# Patient Record
Sex: MALE | Race: WHITE | NOT HISPANIC OR LATINO | Employment: UNEMPLOYED | URBAN - METROPOLITAN AREA
[De-identification: names, ages, dates, MRNs, and addresses within clinical notes are randomized per-mention and may not be internally consistent; named-entity substitution may affect disease eponyms.]

---

## 2018-11-19 ENCOUNTER — HOSPITAL ENCOUNTER (INPATIENT)
Facility: MEDICAL CENTER | Age: 38
LOS: 10 days | DRG: 897 | End: 2018-11-29
Attending: EMERGENCY MEDICINE | Admitting: INTERNAL MEDICINE
Payer: COMMERCIAL

## 2018-11-19 DIAGNOSIS — F10.10 ALCOHOL ABUSE: ICD-10-CM

## 2018-11-19 LAB
ALBUMIN SERPL BCP-MCNC: 4.4 G/DL (ref 3.2–4.9)
ALBUMIN/GLOB SERPL: 1.5 G/DL
ALP SERPL-CCNC: 89 U/L (ref 30–99)
ALT SERPL-CCNC: 22 U/L (ref 2–50)
AMPHET UR QL SCN: NEGATIVE
ANION GAP SERPL CALC-SCNC: 15 MMOL/L (ref 0–11.9)
AST SERPL-CCNC: 23 U/L (ref 12–45)
BARBITURATES UR QL SCN: NEGATIVE
BASOPHILS # BLD AUTO: 1 % (ref 0–1.8)
BASOPHILS # BLD: 0.1 K/UL (ref 0–0.12)
BENZODIAZ UR QL SCN: NEGATIVE
BILIRUB SERPL-MCNC: 0.3 MG/DL (ref 0.1–1.5)
BUN SERPL-MCNC: 11 MG/DL (ref 8–22)
BZE UR QL SCN: NEGATIVE
CALCIUM SERPL-MCNC: 9.3 MG/DL (ref 8.5–10.5)
CANNABINOIDS UR QL SCN: NEGATIVE
CHLORIDE SERPL-SCNC: 102 MMOL/L (ref 96–112)
CO2 SERPL-SCNC: 25 MMOL/L (ref 20–33)
CREAT SERPL-MCNC: 1.17 MG/DL (ref 0.5–1.4)
EOSINOPHIL # BLD AUTO: 0.02 K/UL (ref 0–0.51)
EOSINOPHIL NFR BLD: 0.2 % (ref 0–6.9)
ERYTHROCYTE [DISTWIDTH] IN BLOOD BY AUTOMATED COUNT: 40 FL (ref 35.9–50)
ETHANOL BLD-MCNC: 0.29 G/DL
GLOBULIN SER CALC-MCNC: 2.9 G/DL (ref 1.9–3.5)
GLUCOSE SERPL-MCNC: 129 MG/DL (ref 65–99)
HCT VFR BLD AUTO: 49.7 % (ref 42–52)
HGB BLD-MCNC: 16.7 G/DL (ref 14–18)
IMM GRANULOCYTES # BLD AUTO: 0.03 K/UL (ref 0–0.11)
IMM GRANULOCYTES NFR BLD AUTO: 0.3 % (ref 0–0.9)
LIPASE SERPL-CCNC: 45 U/L (ref 11–82)
LYMPHOCYTES # BLD AUTO: 2.32 K/UL (ref 1–4.8)
LYMPHOCYTES NFR BLD: 22.2 % (ref 22–41)
MCH RBC QN AUTO: 31.5 PG (ref 27–33)
MCHC RBC AUTO-ENTMCNC: 33.6 G/DL (ref 33.7–35.3)
MCV RBC AUTO: 93.6 FL (ref 81.4–97.8)
METHADONE UR QL SCN: NEGATIVE
MONOCYTES # BLD AUTO: 0.71 K/UL (ref 0–0.85)
MONOCYTES NFR BLD AUTO: 6.8 % (ref 0–13.4)
NEUTROPHILS # BLD AUTO: 7.25 K/UL (ref 1.82–7.42)
NEUTROPHILS NFR BLD: 69.5 % (ref 44–72)
NRBC # BLD AUTO: 0 K/UL
NRBC BLD-RTO: 0 /100 WBC
OPIATES UR QL SCN: NEGATIVE
OXYCODONE UR QL SCN: NEGATIVE
PCP UR QL SCN: NEGATIVE
PLATELET # BLD AUTO: 271 K/UL (ref 164–446)
PMV BLD AUTO: 9 FL (ref 9–12.9)
POTASSIUM SERPL-SCNC: 3.8 MMOL/L (ref 3.6–5.5)
PROPOXYPH UR QL SCN: NEGATIVE
PROT SERPL-MCNC: 7.3 G/DL (ref 6–8.2)
RBC # BLD AUTO: 5.31 M/UL (ref 4.7–6.1)
SODIUM SERPL-SCNC: 142 MMOL/L (ref 135–145)
WBC # BLD AUTO: 10.4 K/UL (ref 4.8–10.8)

## 2018-11-19 PROCEDURE — 99285 EMERGENCY DEPT VISIT HI MDM: CPT

## 2018-11-19 PROCEDURE — 85025 COMPLETE CBC W/AUTO DIFF WBC: CPT

## 2018-11-19 PROCEDURE — 700105 HCHG RX REV CODE 258: Performed by: EMERGENCY MEDICINE

## 2018-11-19 PROCEDURE — 96374 THER/PROPH/DIAG INJ IV PUSH: CPT

## 2018-11-19 PROCEDURE — 94760 N-INVAS EAR/PLS OXIMETRY 1: CPT

## 2018-11-19 PROCEDURE — 85610 PROTHROMBIN TIME: CPT

## 2018-11-19 PROCEDURE — 83735 ASSAY OF MAGNESIUM: CPT

## 2018-11-19 PROCEDURE — 80307 DRUG TEST PRSMV CHEM ANLYZR: CPT

## 2018-11-19 PROCEDURE — 770020 HCHG ROOM/CARE - TELE (206)

## 2018-11-19 PROCEDURE — 700111 HCHG RX REV CODE 636 W/ 250 OVERRIDE (IP): Performed by: EMERGENCY MEDICINE

## 2018-11-19 PROCEDURE — 80053 COMPREHEN METABOLIC PANEL: CPT

## 2018-11-19 PROCEDURE — 83690 ASSAY OF LIPASE: CPT

## 2018-11-19 PROCEDURE — HZ2ZZZZ DETOXIFICATION SERVICES FOR SUBSTANCE ABUSE TREATMENT: ICD-10-PCS | Performed by: INTERNAL MEDICINE

## 2018-11-19 PROCEDURE — 36415 COLL VENOUS BLD VENIPUNCTURE: CPT

## 2018-11-19 RX ORDER — NICOTINE 21 MG/24HR
21 PATCH, TRANSDERMAL 24 HOURS TRANSDERMAL
Status: DISCONTINUED | OUTPATIENT
Start: 2018-11-20 | End: 2018-11-29 | Stop reason: HOSPADM

## 2018-11-19 RX ORDER — LORAZEPAM 2 MG/ML
1.5 INJECTION INTRAMUSCULAR
Status: DISCONTINUED | OUTPATIENT
Start: 2018-11-19 | End: 2018-11-22

## 2018-11-19 RX ORDER — LORAZEPAM 2 MG/ML
1 INJECTION INTRAMUSCULAR ONCE
Status: COMPLETED | OUTPATIENT
Start: 2018-11-19 | End: 2018-11-19

## 2018-11-19 RX ORDER — LORAZEPAM 1 MG/1
1 TABLET ORAL EVERY 4 HOURS PRN
Status: DISCONTINUED | OUTPATIENT
Start: 2018-11-19 | End: 2018-11-22

## 2018-11-19 RX ORDER — POLYETHYLENE GLYCOL 3350 17 G/17G
1 POWDER, FOR SOLUTION ORAL
Status: DISCONTINUED | OUTPATIENT
Start: 2018-11-19 | End: 2018-11-29 | Stop reason: HOSPADM

## 2018-11-19 RX ORDER — LORAZEPAM 2 MG/ML
1 INJECTION INTRAMUSCULAR
Status: DISCONTINUED | OUTPATIENT
Start: 2018-11-19 | End: 2018-11-22

## 2018-11-19 RX ORDER — POTASSIUM CHLORIDE 20 MEQ/1
20 TABLET, EXTENDED RELEASE ORAL ONCE
Status: COMPLETED | OUTPATIENT
Start: 2018-11-20 | End: 2018-11-20

## 2018-11-19 RX ORDER — LORAZEPAM 1 MG/1
2 TABLET ORAL
Status: DISCONTINUED | OUTPATIENT
Start: 2018-11-19 | End: 2018-11-22

## 2018-11-19 RX ORDER — FLUOXETINE 10 MG/1
10 CAPSULE ORAL DAILY
COMMUNITY
End: 2018-11-19

## 2018-11-19 RX ORDER — LORAZEPAM 2 MG/ML
0.5 INJECTION INTRAMUSCULAR EVERY 4 HOURS PRN
Status: DISCONTINUED | OUTPATIENT
Start: 2018-11-19 | End: 2018-11-22

## 2018-11-19 RX ORDER — LORAZEPAM 0.5 MG/1
0.5 TABLET ORAL EVERY 4 HOURS PRN
Status: DISCONTINUED | OUTPATIENT
Start: 2018-11-19 | End: 2018-11-22

## 2018-11-19 RX ORDER — FOLIC ACID 1 MG/1
1 TABLET ORAL DAILY
Status: COMPLETED | OUTPATIENT
Start: 2018-11-21 | End: 2018-11-24

## 2018-11-19 RX ORDER — BISACODYL 10 MG
10 SUPPOSITORY, RECTAL RECTAL
Status: DISCONTINUED | OUTPATIENT
Start: 2018-11-19 | End: 2018-11-29 | Stop reason: HOSPADM

## 2018-11-19 RX ORDER — GABAPENTIN 300 MG/1
300 CAPSULE ORAL 3 TIMES DAILY
Status: ON HOLD | COMMUNITY
End: 2018-11-28

## 2018-11-19 RX ORDER — SODIUM CHLORIDE 9 MG/ML
2000 INJECTION, SOLUTION INTRAVENOUS ONCE
Status: COMPLETED | OUTPATIENT
Start: 2018-11-19 | End: 2018-11-20

## 2018-11-19 RX ORDER — THIAMINE MONONITRATE (VIT B1) 100 MG
100 TABLET ORAL DAILY
Status: COMPLETED | OUTPATIENT
Start: 2018-11-21 | End: 2018-11-24

## 2018-11-19 RX ORDER — LORAZEPAM 2 MG/ML
2 INJECTION INTRAMUSCULAR
Status: DISCONTINUED | OUTPATIENT
Start: 2018-11-19 | End: 2018-11-22

## 2018-11-19 RX ORDER — LABETALOL HYDROCHLORIDE 5 MG/ML
10 INJECTION, SOLUTION INTRAVENOUS EVERY 4 HOURS PRN
Status: DISCONTINUED | OUTPATIENT
Start: 2018-11-19 | End: 2018-11-20

## 2018-11-19 RX ORDER — LORAZEPAM 1 MG/1
4 TABLET ORAL
Status: DISCONTINUED | OUTPATIENT
Start: 2018-11-19 | End: 2018-11-22

## 2018-11-19 RX ORDER — LORAZEPAM 1 MG/1
3 TABLET ORAL
Status: DISCONTINUED | OUTPATIENT
Start: 2018-11-19 | End: 2018-11-22

## 2018-11-19 RX ORDER — AMOXICILLIN 250 MG
2 CAPSULE ORAL 2 TIMES DAILY
Status: DISCONTINUED | OUTPATIENT
Start: 2018-11-20 | End: 2018-11-29 | Stop reason: HOSPADM

## 2018-11-19 RX ORDER — TRAZODONE HYDROCHLORIDE 100 MG/1
100 TABLET ORAL NIGHTLY
Status: ON HOLD | COMMUNITY
End: 2018-11-28

## 2018-11-19 RX ORDER — ACETAMINOPHEN 325 MG/1
650 TABLET ORAL EVERY 6 HOURS PRN
Status: DISCONTINUED | OUTPATIENT
Start: 2018-11-19 | End: 2018-11-29 | Stop reason: HOSPADM

## 2018-11-19 RX ADMIN — SODIUM CHLORIDE 2000 ML: 9 INJECTION, SOLUTION INTRAVENOUS at 21:59

## 2018-11-19 RX ADMIN — LORAZEPAM 1 MG: 2 INJECTION INTRAMUSCULAR; INTRAVENOUS at 21:59

## 2018-11-19 ASSESSMENT — LIFESTYLE VARIABLES
AVERAGE NUMBER OF DAYS PER WEEK YOU HAVE A DRINK CONTAINING ALCOHOL: 7
DOES PATIENT WANT TO TALK TO SOMEONE ABOUT QUITTING: YES
HOW MANY TIMES IN THE PAST YEAR HAVE YOU HAD 5 OR MORE DRINKS IN A DAY: 3
TOTAL SCORE: 4
EVER HAD A DRINK FIRST THING IN THE MORNING TO STEADY YOUR NERVES TO GET RID OF A HANGOVER: YES
HAVE PEOPLE ANNOYED YOU BY CRITICIZING YOUR DRINKING: YES
DO YOU DRINK ALCOHOL: YES
DOES PATIENT WANT TO STOP DRINKING: YES
HAVE YOU EVER FELT YOU SHOULD CUT DOWN ON YOUR DRINKING: YES
ON A TYPICAL DAY WHEN YOU DRINK ALCOHOL HOW MANY DRINKS DO YOU HAVE: 12
CONSUMPTION TOTAL: POSITIVE
TOTAL SCORE: 4
TOTAL SCORE: 4
EVER FELT BAD OR GUILTY ABOUT YOUR DRINKING: YES

## 2018-11-19 ASSESSMENT — PAIN SCALES - GENERAL: PAINLEVEL_OUTOF10: 4

## 2018-11-20 PROBLEM — J45.909 ASTHMA: Status: ACTIVE | Noted: 2018-11-20

## 2018-11-20 PROBLEM — F10.10 ALCOHOL ABUSE: Status: ACTIVE | Noted: 2018-11-20

## 2018-11-20 PROBLEM — M19.90 ARTHRITIS: Status: ACTIVE | Noted: 2018-11-20

## 2018-11-20 PROBLEM — F41.9 ANXIETY: Status: ACTIVE | Noted: 2018-11-20

## 2018-11-20 PROBLEM — G47.00 INSOMNIA: Status: ACTIVE | Noted: 2018-11-20

## 2018-11-20 LAB
EKG IMPRESSION: NORMAL
INR PPP: 0.96 (ref 0.87–1.13)
MAGNESIUM SERPL-MCNC: 2 MG/DL (ref 1.5–2.5)
PROTHROMBIN TIME: 12.8 SEC (ref 12–14.6)

## 2018-11-20 PROCEDURE — 770001 HCHG ROOM/CARE - MED/SURG/GYN PRIV*

## 2018-11-20 PROCEDURE — 93010 ELECTROCARDIOGRAM REPORT: CPT | Performed by: INTERNAL MEDICINE

## 2018-11-20 PROCEDURE — 90732 PPSV23 VACC 2 YRS+ SUBQ/IM: CPT | Performed by: INTERNAL MEDICINE

## 2018-11-20 PROCEDURE — 700111 HCHG RX REV CODE 636 W/ 250 OVERRIDE (IP): Performed by: STUDENT IN AN ORGANIZED HEALTH CARE EDUCATION/TRAINING PROGRAM

## 2018-11-20 PROCEDURE — 700105 HCHG RX REV CODE 258: Performed by: STUDENT IN AN ORGANIZED HEALTH CARE EDUCATION/TRAINING PROGRAM

## 2018-11-20 PROCEDURE — A9270 NON-COVERED ITEM OR SERVICE: HCPCS | Performed by: STUDENT IN AN ORGANIZED HEALTH CARE EDUCATION/TRAINING PROGRAM

## 2018-11-20 PROCEDURE — 93005 ELECTROCARDIOGRAM TRACING: CPT | Performed by: STUDENT IN AN ORGANIZED HEALTH CARE EDUCATION/TRAINING PROGRAM

## 2018-11-20 PROCEDURE — 700102 HCHG RX REV CODE 250 W/ 637 OVERRIDE(OP): Performed by: STUDENT IN AN ORGANIZED HEALTH CARE EDUCATION/TRAINING PROGRAM

## 2018-11-20 PROCEDURE — 99223 1ST HOSP IP/OBS HIGH 75: CPT | Mod: AI,GC | Performed by: INTERNAL MEDICINE

## 2018-11-20 PROCEDURE — 700111 HCHG RX REV CODE 636 W/ 250 OVERRIDE (IP): Performed by: INTERNAL MEDICINE

## 2018-11-20 PROCEDURE — 90471 IMMUNIZATION ADMIN: CPT

## 2018-11-20 PROCEDURE — 700101 HCHG RX REV CODE 250: Performed by: STUDENT IN AN ORGANIZED HEALTH CARE EDUCATION/TRAINING PROGRAM

## 2018-11-20 RX ORDER — FLUOXETINE HYDROCHLORIDE 20 MG/1
20 CAPSULE ORAL DAILY
Status: ON HOLD | COMMUNITY
End: 2018-11-28

## 2018-11-20 RX ORDER — SODIUM CHLORIDE 9 MG/ML
INJECTION, SOLUTION INTRAVENOUS CONTINUOUS
Status: DISCONTINUED | OUTPATIENT
Start: 2018-11-20 | End: 2018-11-22

## 2018-11-20 RX ORDER — FLUOXETINE 10 MG/1
10 CAPSULE ORAL DAILY
Status: DISCONTINUED | OUTPATIENT
Start: 2018-11-20 | End: 2018-11-20

## 2018-11-20 RX ORDER — GABAPENTIN 300 MG/1
300 CAPSULE ORAL 3 TIMES DAILY
Status: DISCONTINUED | OUTPATIENT
Start: 2018-11-20 | End: 2018-11-29 | Stop reason: HOSPADM

## 2018-11-20 RX ORDER — FLUOXETINE HYDROCHLORIDE 20 MG/1
20 CAPSULE ORAL DAILY
Status: DISCONTINUED | OUTPATIENT
Start: 2018-11-21 | End: 2018-11-22

## 2018-11-20 RX ORDER — TRAZODONE HYDROCHLORIDE 50 MG/1
100 TABLET ORAL NIGHTLY
Status: DISCONTINUED | OUTPATIENT
Start: 2018-11-20 | End: 2018-11-22

## 2018-11-20 RX ORDER — HYDRALAZINE HYDROCHLORIDE 20 MG/ML
10 INJECTION INTRAMUSCULAR; INTRAVENOUS EVERY 6 HOURS PRN
Status: DISCONTINUED | OUTPATIENT
Start: 2018-11-20 | End: 2018-11-29 | Stop reason: HOSPADM

## 2018-11-20 RX ORDER — ALBUTEROL SULFATE 90 UG/1
2 AEROSOL, METERED RESPIRATORY (INHALATION) EVERY 4 HOURS PRN
Status: DISCONTINUED | OUTPATIENT
Start: 2018-11-20 | End: 2018-11-29 | Stop reason: HOSPADM

## 2018-11-20 RX ORDER — CLONIDINE HYDROCHLORIDE 0.1 MG/1
0.1 TABLET ORAL TWICE DAILY
Status: DISCONTINUED | OUTPATIENT
Start: 2018-11-20 | End: 2018-11-22

## 2018-11-20 RX ADMIN — ALBUTEROL SULFATE 2 PUFF: 90 AEROSOL, METERED RESPIRATORY (INHALATION) at 06:44

## 2018-11-20 RX ADMIN — CLONIDINE HYDROCHLORIDE 0.1 MG: 0.1 TABLET ORAL at 11:18

## 2018-11-20 RX ADMIN — POTASSIUM CHLORIDE: 2 INJECTION, SOLUTION, CONCENTRATE INTRAVENOUS at 03:24

## 2018-11-20 RX ADMIN — LORAZEPAM 1 MG: 1 TABLET ORAL at 07:03

## 2018-11-20 RX ADMIN — TRAZODONE HYDROCHLORIDE 100 MG: 100 TABLET ORAL at 22:01

## 2018-11-20 RX ADMIN — POTASSIUM CHLORIDE 20 MEQ: 1500 TABLET, EXTENDED RELEASE ORAL at 00:56

## 2018-11-20 RX ADMIN — PNEUMOCOCCAL VACCINE POLYVALENT 25 MCG
25; 25; 25; 25; 25; 25; 25; 25; 25; 25; 25; 25; 25; 25; 25; 25; 25; 25; 25; 25; 25; 25; 25 INJECTION, SOLUTION INTRAMUSCULAR; SUBCUTANEOUS at 07:03

## 2018-11-20 RX ADMIN — LORAZEPAM 2 MG: 1 TABLET ORAL at 20:23

## 2018-11-20 RX ADMIN — ALBUTEROL SULFATE 2 PUFF: 90 AEROSOL, METERED RESPIRATORY (INHALATION) at 14:16

## 2018-11-20 RX ADMIN — ENOXAPARIN SODIUM 40 MG: 100 INJECTION SUBCUTANEOUS at 06:44

## 2018-11-20 RX ADMIN — ALBUTEROL SULFATE 2 PUFF: 90 AEROSOL, METERED RESPIRATORY (INHALATION) at 20:17

## 2018-11-20 RX ADMIN — LORAZEPAM 1 MG: 2 INJECTION INTRAMUSCULAR; INTRAVENOUS at 01:24

## 2018-11-20 RX ADMIN — TRAZODONE HYDROCHLORIDE 100 MG: 100 TABLET ORAL at 01:24

## 2018-11-20 RX ADMIN — SODIUM CHLORIDE: 9 INJECTION, SOLUTION INTRAVENOUS at 22:02

## 2018-11-20 RX ADMIN — LORAZEPAM 1 MG: 1 TABLET ORAL at 11:18

## 2018-11-20 RX ADMIN — GABAPENTIN 300 MG: 300 CAPSULE ORAL at 06:44

## 2018-11-20 RX ADMIN — GABAPENTIN 300 MG: 300 CAPSULE ORAL at 11:18

## 2018-11-20 RX ADMIN — CLONIDINE HYDROCHLORIDE 0.1 MG: 0.1 TABLET ORAL at 17:06

## 2018-11-20 RX ADMIN — FLUOXETINE 10 MG: 10 CAPSULE ORAL at 06:44

## 2018-11-20 RX ADMIN — SODIUM CHLORIDE: 9 INJECTION, SOLUTION INTRAVENOUS at 14:28

## 2018-11-20 RX ADMIN — GABAPENTIN 300 MG: 300 CAPSULE ORAL at 17:06

## 2018-11-20 RX ADMIN — LORAZEPAM 1 MG: 1 TABLET ORAL at 17:06

## 2018-11-20 RX ADMIN — LORAZEPAM 2 MG: 1 TABLET ORAL at 22:01

## 2018-11-20 RX ADMIN — LORAZEPAM 0.5 MG: 1 TABLET ORAL at 03:24

## 2018-11-20 RX ADMIN — LORAZEPAM 2 MG: 1 TABLET ORAL at 14:14

## 2018-11-20 ASSESSMENT — COGNITIVE AND FUNCTIONAL STATUS - GENERAL
SUGGESTED CMS G CODE MODIFIER MOBILITY: CH
SUGGESTED CMS G CODE MODIFIER DAILY ACTIVITY: CH
MOBILITY SCORE: 24
DAILY ACTIVITIY SCORE: 24

## 2018-11-20 ASSESSMENT — LIFESTYLE VARIABLES
PAROXYSMAL SWEATS: BARELY PERCEPTIBLE SWEATING. PALMS MOIST
NAUSEA AND VOMITING: NO NAUSEA AND NO VOMITING
NAUSEA AND VOMITING: NO NAUSEA AND NO VOMITING
AGITATION: NORMAL ACTIVITY
HEADACHE, FULLNESS IN HEAD: MILD
TOTAL SCORE: 5
AGITATION: *
AUDITORY DISTURBANCES: NOT PRESENT
PAROXYSMAL SWEATS: BARELY PERCEPTIBLE SWEATING. PALMS MOIST
NAUSEA AND VOMITING: NO NAUSEA AND NO VOMITING
VISUAL DISTURBANCES: NOT PRESENT
ANXIETY: *
HEADACHE, FULLNESS IN HEAD: MILD
VISUAL DISTURBANCES: NOT PRESENT
ORIENTATION AND CLOUDING OF SENSORIUM: ORIENTED AND CAN DO SERIAL ADDITIONS
AGITATION: *
AUDITORY DISTURBANCES: NOT PRESENT
AGITATION: *
ORIENTATION AND CLOUDING OF SENSORIUM: ORIENTED AND CAN DO SERIAL ADDITIONS
VISUAL DISTURBANCES: NOT PRESENT
PAROXYSMAL SWEATS: BARELY PERCEPTIBLE SWEATING. PALMS MOIST
EVER_SMOKED: YES
VISUAL DISTURBANCES: NOT PRESENT
PAROXYSMAL SWEATS: BARELY PERCEPTIBLE SWEATING. PALMS MOIST
PAROXYSMAL SWEATS: BEADS OF SWEAT OBVIOUS ON FOREHEAD
TREMOR: TREMOR NOT VISIBLE BUT CAN BE FELT, FINGERTIP TO FINGERTIP
ORIENTATION AND CLOUDING OF SENSORIUM: ORIENTED AND CAN DO SERIAL ADDITIONS
HEADACHE, FULLNESS IN HEAD: VERY MILD
TREMOR: TREMOR NOT VISIBLE BUT CAN BE FELT, FINGERTIP TO FINGERTIP
PAROXYSMAL SWEATS: BARELY PERCEPTIBLE SWEATING. PALMS MOIST
TREMOR: *
VISUAL DISTURBANCES: NOT PRESENT
ANXIETY: *
ANXIETY: MILDLY ANXIOUS
TREMOR: TREMOR NOT VISIBLE BUT CAN BE FELT, FINGERTIP TO FINGERTIP
VISUAL DISTURBANCES: NOT PRESENT
AUDITORY DISTURBANCES: NOT PRESENT
PAROXYSMAL SWEATS: BARELY PERCEPTIBLE SWEATING. PALMS MOIST
AGITATION: *
TOTAL SCORE: 9
TOTAL SCORE: 11
ANXIETY: *
NAUSEA AND VOMITING: NO NAUSEA AND NO VOMITING
ORIENTATION AND CLOUDING OF SENSORIUM: ORIENTED AND CAN DO SERIAL ADDITIONS
TOTAL SCORE: 12
TREMOR: TREMOR NOT VISIBLE BUT CAN BE FELT, FINGERTIP TO FINGERTIP
TREMOR: NO TREMOR
HEADACHE, FULLNESS IN HEAD: VERY MILD
TREMOR: TREMOR NOT VISIBLE BUT CAN BE FELT, FINGERTIP TO FINGERTIP
TREMOR: TREMOR NOT VISIBLE BUT CAN BE FELT, FINGERTIP TO FINGERTIP
HEADACHE, FULLNESS IN HEAD: MODERATE
TOTAL SCORE: VERY MILD ITCHING, PINS AND NEEDLES SENSATION, BURNING OR NUMBNESS
AUDITORY DISTURBANCES: NOT PRESENT
NAUSEA AND VOMITING: NO NAUSEA AND NO VOMITING
AGITATION: SOMEWHAT MORE THAN NORMAL ACTIVITY
HEADACHE, FULLNESS IN HEAD: MILD
AUDITORY DISTURBANCES: NOT PRESENT
NAUSEA AND VOMITING: NO NAUSEA AND NO VOMITING
AUDITORY DISTURBANCES: NOT PRESENT
NAUSEA AND VOMITING: NO NAUSEA AND NO VOMITING
AUDITORY DISTURBANCES: NOT PRESENT
ORIENTATION AND CLOUDING OF SENSORIUM: ORIENTED AND CAN DO SERIAL ADDITIONS
ANXIETY: MODERATELY ANXIOUS OR GUARDED, SO ANXIETY IS INFERRED
ORIENTATION AND CLOUDING OF SENSORIUM: ORIENTED AND CAN DO SERIAL ADDITIONS
SUBSTANCE_ABUSE: 1
TOTAL SCORE: 10
ANXIETY: MODERATELY ANXIOUS OR GUARDED, SO ANXIETY IS INFERRED
AGITATION: *
ORIENTATION AND CLOUDING OF SENSORIUM: ORIENTED AND CAN DO SERIAL ADDITIONS
VISUAL DISTURBANCES: NOT PRESENT
PAROXYSMAL SWEATS: BARELY PERCEPTIBLE SWEATING. PALMS MOIST
ANXIETY: *
TOTAL SCORE: 13
ORIENTATION AND CLOUDING OF SENSORIUM: ORIENTED AND CAN DO SERIAL ADDITIONS
TOTAL SCORE: 11
AUDITORY DISTURBANCES: NOT PRESENT
AGITATION: *
HEADACHE, FULLNESS IN HEAD: MILD
ANXIETY: *
HEADACHE, FULLNESS IN HEAD: MILD
TOTAL SCORE: 8
VISUAL DISTURBANCES: NOT PRESENT
NAUSEA AND VOMITING: MILD NAUSEA WITH NO VOMITING

## 2018-11-20 ASSESSMENT — ENCOUNTER SYMPTOMS
HALLUCINATIONS: 1
WEIGHT LOSS: 0
BLOOD IN STOOL: 0
CHILLS: 0
SORE THROAT: 0
SHORTNESS OF BREATH: 0
DOUBLE VISION: 0
SPUTUM PRODUCTION: 0
WHEEZING: 0
DIAPHORESIS: 0
DIAPHORESIS: 1
ABDOMINAL PAIN: 0
BLURRED VISION: 0
MYALGIAS: 0
PALPITATIONS: 0
FEVER: 0
DIARRHEA: 0
HEADACHES: 0
DIZZINESS: 0
TREMORS: 1
WEAKNESS: 0
COUGH: 0
CONSTIPATION: 0
NERVOUS/ANXIOUS: 1
VOMITING: 0
NAUSEA: 1

## 2018-11-20 ASSESSMENT — PATIENT HEALTH QUESTIONNAIRE - PHQ9
4. FEELING TIRED OR HAVING LITTLE ENERGY: SEVERAL DAYS
SUM OF ALL RESPONSES TO PHQ QUESTIONS 1-9: 10
5. POOR APPETITE OR OVEREATING: NOT AT ALL
2. FEELING DOWN, DEPRESSED, IRRITABLE, OR HOPELESS: MORE THAN HALF THE DAYS
8. MOVING OR SPEAKING SO SLOWLY THAT OTHER PEOPLE COULD HAVE NOTICED. OR THE OPPOSITE, BEING SO FIGETY OR RESTLESS THAT YOU HAVE BEEN MOVING AROUND A LOT MORE THAN USUAL: NOT AT ALL
1. LITTLE INTEREST OR PLEASURE IN DOING THINGS: MORE THAN HALF THE DAYS
7. TROUBLE CONCENTRATING ON THINGS, SUCH AS READING THE NEWSPAPER OR WATCHING TELEVISION: SEVERAL DAYS
SUM OF ALL RESPONSES TO PHQ9 QUESTIONS 1 AND 2: 4
6. FEELING BAD ABOUT YOURSELF - OR THAT YOU ARE A FAILURE OR HAVE LET YOURSELF OR YOUR FAMILY DOWN: SEVERAL DAYS
9. THOUGHTS THAT YOU WOULD BE BETTER OFF DEAD, OR OF HURTING YOURSELF: SEVERAL DAYS
3. TROUBLE FALLING OR STAYING ASLEEP OR SLEEPING TOO MUCH: MORE THAN HALF THE DAYS

## 2018-11-20 ASSESSMENT — PAIN SCALES - GENERAL
PAINLEVEL_OUTOF10: 0

## 2018-11-20 ASSESSMENT — COPD QUESTIONNAIRES
DO YOU EVER COUGH UP ANY MUCUS OR PHLEGM?: NO/ONLY WITH OCCASIONAL COLDS OR INFECTIONS
DURING THE PAST 4 WEEKS HOW MUCH DID YOU FEEL SHORT OF BREATH: SOME OF THE TIME
HAVE YOU SMOKED AT LEAST 100 CIGARETTES IN YOUR ENTIRE LIFE: YES
COPD SCREENING SCORE: 3

## 2018-11-20 NOTE — CARE PLAN
Problem: Safety  Goal: Will remain free from injury  Outcome: PROGRESSING AS EXPECTED  Pt educated about use of call light and getting out of bed. Call light within reach. Pt verbalized understanding and calls appropriately. Personal slipper socks in place. Bed in low and locked position. Appropriate sign outside of room.       Problem: Knowledge Deficit  Goal: Knowledge of disease process/condition, treatment plan, diagnostic tests, and medications will improve  Outcome: PROGRESSING AS EXPECTED  Discussed POC with pt. Answered all questions appropriately. White board updated. All medications and interventions explained before performed. Pt verbalized understanding.

## 2018-11-20 NOTE — SENIOR ADMIT NOTE
"Senior Admit Note    History  38 year old male with a history of asthma who presents with alcohol intoxication and suicidal ideation. Last drink 5 or 6pm. Drinks \"a couple pints of whiskey a day, maybe three or more actually,\" when asked for how long he states \"I don't even know.\" States he was in rehab 2015 and 2016. Denies history of seizures or hallucinations when stopping drinking. Denies prior ICU stay or intubation. Denies prior or current hematemesis or bloody stool. Smokes 10 cigarettes a day, denies illicit drugs.    Per ED Physician, patient reported he \"wants to drink himself to death.\" When patient asked, he stated \"if I answer yes I'll be on a list forever, no I don't.\"    Exam  /78   Pulse (!) 121   Temp 37.2 °C (99 °F) (Temporal)   Resp (!) 27   Ht 1.753 m (5' 9\")   Wt 82.1 kg (181 lb)   SpO2 90%   BMI 26.73 kg/m²     Alert, answering questions appropriately, restless  Pupils equal round and reactive to light  Tachycardic to low 100s, regular rhythm  Lungs clear to auscultation bilaterally  Abdomen soft/non-tender/non-distended    Assessment  Alcohol intoxication  Question of suicidal ideation    Plan  Admit to telemetry  CIWA protocol, seizure/fall/aspiration precautions  Trend and repeat electrolytes  Thiamine, folate, MV  Trend EKGs  Nicotine replacement therapy  Respiratory therapy  Psychiatry consult in the morning  Continue home meds    This note is from Sae Campos M.D.        "

## 2018-11-20 NOTE — PROGRESS NOTES
[facility] to hospital bed with steady gait. Tele monitor in place. Monitor room notified.     Assumed care report received. Assessment completed. AOx4. Pt resting in bed, denies any pain at this time. No other complaints at this time. Plan of care discussed, verbalized understanding. Fall precautions in place. Call light within reach. White board updated.    Pt states he has had suicidal ideation before but is not currently having those thoughts. Pt denies having any plan.

## 2018-11-20 NOTE — PROGRESS NOTES
Med Rec completed per patient and [facility] in [facility]   Allergies reviewed  No ORAL antibiotics in last 30 days

## 2018-11-20 NOTE — ED TRIAGE NOTES
Jef Spence  38 y.o.  male  Chief Complaint   Patient presents with   • Detox     Present to triage states he wanted to Detox from Alcohol. Anxious in triage. Last drink 20 mins ago. No tremors noted.

## 2018-11-20 NOTE — ED PROVIDER NOTES
"ED Provider Note    CHIEF COMPLAINT  Chief Complaint   Patient presents with   • Detox       HPI  Jef Spence is a 38 y.o. male who presents asking for help for his alcohol abuse and intoxication.  Patient states his been drinking for several weeks.  He states that he feels like he is going to kill himself if he is discharged.  He will not tell me if he means he is going to kill himself by drinking or by other means.  He states he does not feel like he should be alive.  The patient is very agitated and anxious.  Does not have any current medical complaints.  His only past medical history is significant for his alcohol abuse and intoxication.  He has not any recent vomiting.  He states his last drink was a couple hours prior to arrival.    REVIEW OF SYSTEMS  See HPI for further details. All other systems are negative.     PAST MEDICAL HISTORY  Past Medical History:   Diagnosis Date   • Alcohol abuse        SOCIAL HISTORY  Social History     Social History   • Marital status: N/A     Spouse name: N/A   • Number of children: N/A   • Years of education: N/A     Social History Main Topics   • Smoking status: Current Every Day Smoker     Packs/day: 0.50     Types: Cigarettes   • Smokeless tobacco: Never Used   • Alcohol use Yes      Comment: 20 year    • Drug use: No   • Sexual activity: Not on file     Other Topics Concern   • Not on file     Social History Narrative   • No narrative on file           PHYSICAL EXAM  VITAL SIGNS: /78   Pulse 60   Temp 37.2 °C (99 °F) (Temporal)   Resp 16   Ht 1.753 m (5' 9\")   Wt 82.1 kg (181 lb)   SpO2 95%   BMI 26.73 kg/m²     Constitutional: Agitated and intoxicated  HENT: Normocephalic, Atraumatic, tympanic membranes are intact and nonerythematous bilaterally, Oropharynx moist without exudates or erythema, Nose normal.   Eyes: PERRLA, EOMI, bilateral conjunctival injection  Neck: Supple without meningismus  Lymphatic: No lymphadenopathy noted.   Cardiovascular: " Tachycardic heart rate, Normal rhythm, No murmurs, No rubs, No gallops.   Thorax & Lungs: Normal breath sounds, No respiratory distress, No wheezing, No chest tenderness.   Abdomen: Bowel sounds normal, Soft, No tenderness, no rebound, no guarding, no distention, No masses, No pulsatile masses.   Skin: Warm, Dry, No erythema, No rash.   Back: No tenderness, No CVA tenderness.   Extremities: Atraumatic with symmetric distal pulses, No edema, No tenderness, No cyanosis, No clubbing.   Neurologic: Alert & oriented x 3, cranial nerves II through XII are intact, Normal motor function, Normal sensory function, No focal deficits noted.   Psychiatric: Depressed affect      COURSE & MEDICAL DECISION MAKING  Pertinent Labs & Imaging studies reviewed. (See chart for details)  This is a 38-year-old male who presents the emerge department asking for help for his alcohol abuse and intoxication.  The patient was stating that he should not be around and is making comments that he is suicidal as well.  Therefore the patient admitted to the hospital and be treated for withdrawals.  When he is legally sober psychiatry will need to get involved to see if the patient is truly suicidal.  He did receive some Ativan on arrival as he is agitated and this was effective in he is resting comfortably on repeat examination.  He is also received a couple liters of fluids and he does feel better on repeat examination his pulses come down to the low 100s.  The patient be admitted in stable condition.    FINAL IMPRESSION  1.  Alcohol abuse and intoxication  2.  Suicidal ideation       Disposition  The patient will be admitted in stable condition    Electronically signed by: Oliver Ernst, 11/19/2018 9:36 PM

## 2018-11-20 NOTE — DISCHARGE PLANNING
Anticipated Discharge Disposition: D/C to Shelter/Self-Care    Action: LSW met with pt at bedside to discuss d/c planning. Pt is homeless, however, LSW noted pt to have a bag of belongings and a MacBook at bedside. Pt reported he wants to go to inpatient treatment. Pt only has [facility] and denies financial ability to pay for treatment. LSW suggested that pt seek out services in [place] where his insurance is accepted, or pt can apply for Medicaid to receive services locally. LSW explained to pt that the shelter is the only immediate d/c option. Community and substance abuse treatment resources were provided to pt.     Pt indicated he has some money that he will use at time of d/c for transportation.     Barriers to Discharge: None.    Plan: No further d/c planning needs at this time.

## 2018-11-20 NOTE — ED TRIAGE NOTES
"Chief Complaint   Patient presents with   • Detox     Agree with triage note, pt ambulatory to red 8. Pt  states he wanted to Detox from Alcohol. Anxious in triage. Last drink just before he checked into the ED. No tremors noted. PT reports drinking between 2-3 liters of alcohol a day. Pt denies ever having a seizure in relation to not drinking but has stated he gets  \"sweaty and shaky when he doesn't drink.\" Pt placed on cardiac monitor, BP cuff and pulse ox.      "

## 2018-11-20 NOTE — PROGRESS NOTES
Internal Medicine Interval Note  Note Author: Flaco Ren M.D.     Name Jef Spence       1980   Age/Sex 38 y.o. male   MRN [id number]   Code Status Full     After 5PM or if no immediate response to page, please call for cross-coverage  Attending/Team: Isaiah Lang See Patient List for primary contact information  Call [phone] to page    1st Call - Day Intern (R1):   Dr. Ren 2nd Call - Day Sr. Resident (R2/R3):   Dr. Betancourt         Reason for interval visit  (Principal Problem)   Alcohol Detox/withdrawal      Interval Problem Daily Status Update  (24 hours, problem oriented, brief subjective history, new lab/imaging data pertinent to that problem)     -no acute events since admission.  CIWA score 5 -> 10, for anxiety, agitation, tremorrs, sweats.  Patient is committed to going sober.    -currently denies suicidal ideation, despite documented by ED physicain to have active suicidal ideation.  Patient still wants to see a psychiatrist here in the inpatient setting.    Review of Systems   Constitutional: Positive for diaphoresis. Negative for chills and fever.   Respiratory: Negative for cough and shortness of breath.    Cardiovascular: Negative for chest pain and palpitations.   Gastrointestinal: Positive for nausea. Negative for abdominal pain, blood in stool and vomiting.   Genitourinary: Negative for dysuria.   Neurological: Positive for tremors. Negative for dizziness.   Psychiatric/Behavioral: The patient is nervous/anxious.        Disposition/Barriers to discharge:   Home, possibly to rehab  No anticipated barriers to discharge    Consultants/Specialty  none  PCP: No primary care provider on file.      Quality Measures  Quality-Core Measures   Reviewed items::  Labs reviewed and Medications reviewed  Johnson catheter::  No Johnson  DVT prophylaxis pharmacological::  Enoxaparin (Lovenox)          Physical Exam       Vitals:    18 0110 18 0555 18 0809 18 1258   BP:  139/91 111/76 106/72 129/86   Pulse: 85 73 95 79   Resp: 16 16 (!) 95 18   Temp: 36.9 °C (98.5 °F) 37 °C (98.6 °F) 36.6 °C (97.9 °F) 36.6 °C (97.8 °F)   TempSrc: Temporal Temporal Temporal Temporal   SpO2: 92% 96% 94% 95%   Weight: 83.4 kg (183 lb 13.8 oz)      Height:         Body mass index is 27.15 kg/m². Weight: 83.4 kg (183 lb 13.8 oz)  Oxygen Therapy:  Pulse Oximetry: 95 %, O2 (LPM): 0, O2 Delivery: None (Room Air)    Physical Exam   Constitutional: He is oriented to person, place, and time and well-developed, well-nourished, and in no distress.   HENT:   Head: Normocephalic and atraumatic.   Eyes: Pupils are equal, round, and reactive to light. EOM are normal.   Neck: Neck supple. No thyromegaly present.   Cardiovascular: Normal rate, regular rhythm and normal heart sounds.  Exam reveals no gallop and no friction rub.    No murmur heard.  Pulmonary/Chest: Effort normal and breath sounds normal. No respiratory distress. He has no wheezes.   Abdominal: Soft. Bowel sounds are normal. He exhibits no distension.   Musculoskeletal: Normal range of motion. He exhibits no edema, tenderness or deformity.   Neurological: He is alert and oriented to person, place, and time.   Skin: Skin is warm and dry.   Psychiatric: Affect normal.         Assessment/Plan     * Alcohol abuse- (present on admission)   Assessment & Plan    Patient presented requesting detox, no h/o DTs seizures but drinks 2-3 pints daily of whiskey  Etoh level 0.29 on admit  Denies any other drug use  Attempted to quit multiple times, requesting assistance  Stated SI to EDP, but currently denies suicidal ideation    Plan:   -telemetry  -[facility] protocol  -scheduled clonidine for hyper-adrenergic symptoms  -continue gabapentin and fluoxetine at home doses to avoid CNS withdrawal hyperactivity  -consider psych consult only if patient acutely active suicidal ideation  -po thiamine/folate/MV     Anxiety   Assessment & Plan    Continue home prozac     Insomnia-  (present on admission)   Assessment & Plan    Continue home trazodone     Arthritis- (present on admission)   Assessment & Plan    Continue home diclofenac and gabapentin     Asthma   Assessment & Plan    Albuterol prn

## 2018-11-20 NOTE — ASSESSMENT & PLAN NOTE
-Presented to ED immediately after drinking claiming wanting to detox.  -Etoh level 0.29 on admit  -CIWA protocol discontinued -no seizures noted  -Attempted to quit multiple times, requesting assistance    -Continue oral thiamine, folate, multivitamin

## 2018-11-20 NOTE — PROGRESS NOTES
2 RN skin check done with Zaid GARCIA.    Bilateral red rash to hands/knuckles.  Sacrum redness, blanching.

## 2018-11-20 NOTE — ASSESSMENT & PLAN NOTE
-increased fluoxetine dosage to 30mg per day from home dose 10 mg  -hydroxyzine 25mg q4hrs for anxiety

## 2018-11-21 ENCOUNTER — PATIENT OUTREACH (OUTPATIENT)
Dept: HEALTH INFORMATION MANAGEMENT | Facility: OTHER | Age: 38
End: 2018-11-21

## 2018-11-21 LAB
BASOPHILS # BLD AUTO: 0.7 % (ref 0–1.8)
BASOPHILS # BLD: 0.05 K/UL (ref 0–0.12)
EKG IMPRESSION: NORMAL
EOSINOPHIL # BLD AUTO: 0.18 K/UL (ref 0–0.51)
EOSINOPHIL NFR BLD: 2.4 % (ref 0–6.9)
ERYTHROCYTE [DISTWIDTH] IN BLOOD BY AUTOMATED COUNT: 39.2 FL (ref 35.9–50)
HCT VFR BLD AUTO: 41.4 % (ref 42–52)
HGB BLD-MCNC: 14.4 G/DL (ref 14–18)
IMM GRANULOCYTES # BLD AUTO: 0.02 K/UL (ref 0–0.11)
IMM GRANULOCYTES NFR BLD AUTO: 0.3 % (ref 0–0.9)
LYMPHOCYTES # BLD AUTO: 1.62 K/UL (ref 1–4.8)
LYMPHOCYTES NFR BLD: 21.3 % (ref 22–41)
MAGNESIUM SERPL-MCNC: 1.9 MG/DL (ref 1.5–2.5)
MCH RBC QN AUTO: 32.6 PG (ref 27–33)
MCHC RBC AUTO-ENTMCNC: 34.8 G/DL (ref 33.7–35.3)
MCV RBC AUTO: 93.7 FL (ref 81.4–97.8)
MONOCYTES # BLD AUTO: 0.86 K/UL (ref 0–0.85)
MONOCYTES NFR BLD AUTO: 11.3 % (ref 0–13.4)
NEUTROPHILS # BLD AUTO: 4.89 K/UL (ref 1.82–7.42)
NEUTROPHILS NFR BLD: 64 % (ref 44–72)
NRBC # BLD AUTO: 0 K/UL
NRBC BLD-RTO: 0 /100 WBC
PHOSPHATE SERPL-MCNC: 3.2 MG/DL (ref 2.5–4.5)
PLATELET # BLD AUTO: 159 K/UL (ref 164–446)
PMV BLD AUTO: 9.2 FL (ref 9–12.9)
RBC # BLD AUTO: 4.42 M/UL (ref 4.7–6.1)
WBC # BLD AUTO: 7.6 K/UL (ref 4.8–10.8)

## 2018-11-21 PROCEDURE — A9270 NON-COVERED ITEM OR SERVICE: HCPCS | Performed by: STUDENT IN AN ORGANIZED HEALTH CARE EDUCATION/TRAINING PROGRAM

## 2018-11-21 PROCEDURE — 36415 COLL VENOUS BLD VENIPUNCTURE: CPT

## 2018-11-21 PROCEDURE — 99233 SBSQ HOSP IP/OBS HIGH 50: CPT | Mod: GC | Performed by: INTERNAL MEDICINE

## 2018-11-21 PROCEDURE — 700111 HCHG RX REV CODE 636 W/ 250 OVERRIDE (IP): Performed by: STUDENT IN AN ORGANIZED HEALTH CARE EDUCATION/TRAINING PROGRAM

## 2018-11-21 PROCEDURE — 700102 HCHG RX REV CODE 250 W/ 637 OVERRIDE(OP): Performed by: STUDENT IN AN ORGANIZED HEALTH CARE EDUCATION/TRAINING PROGRAM

## 2018-11-21 PROCEDURE — 93005 ELECTROCARDIOGRAM TRACING: CPT | Performed by: STUDENT IN AN ORGANIZED HEALTH CARE EDUCATION/TRAINING PROGRAM

## 2018-11-21 PROCEDURE — 84100 ASSAY OF PHOSPHORUS: CPT

## 2018-11-21 PROCEDURE — 83735 ASSAY OF MAGNESIUM: CPT

## 2018-11-21 PROCEDURE — 93010 ELECTROCARDIOGRAM REPORT: CPT | Performed by: INTERNAL MEDICINE

## 2018-11-21 PROCEDURE — 770006 HCHG ROOM/CARE - MED/SURG/GYN SEMI*

## 2018-11-21 PROCEDURE — 700105 HCHG RX REV CODE 258: Performed by: STUDENT IN AN ORGANIZED HEALTH CARE EDUCATION/TRAINING PROGRAM

## 2018-11-21 PROCEDURE — 85025 COMPLETE CBC W/AUTO DIFF WBC: CPT

## 2018-11-21 RX ADMIN — CLONIDINE HYDROCHLORIDE 0.1 MG: 0.1 TABLET ORAL at 06:16

## 2018-11-21 RX ADMIN — SODIUM CHLORIDE: 9 INJECTION, SOLUTION INTRAVENOUS at 12:47

## 2018-11-21 RX ADMIN — THIAMINE HCL TAB 100 MG 100 MG: 100 TAB at 06:16

## 2018-11-21 RX ADMIN — STANDARDIZED SENNA CONCENTRATE AND DOCUSATE SODIUM 2 TABLET: 8.6; 5 TABLET, FILM COATED ORAL at 06:16

## 2018-11-21 RX ADMIN — GABAPENTIN 300 MG: 300 CAPSULE ORAL at 12:46

## 2018-11-21 RX ADMIN — NICOTINE 21 MG: 21 PATCH, EXTENDED RELEASE TRANSDERMAL at 09:16

## 2018-11-21 RX ADMIN — VALPROIC ACID 250 MG: 250 SOLUTION ORAL at 20:37

## 2018-11-21 RX ADMIN — LORAZEPAM 2 MG: 1 TABLET ORAL at 02:16

## 2018-11-21 RX ADMIN — TRAZODONE HYDROCHLORIDE 100 MG: 100 TABLET ORAL at 20:37

## 2018-11-21 RX ADMIN — VALPROIC ACID 250 MG: 250 SOLUTION ORAL at 12:46

## 2018-11-21 RX ADMIN — LORAZEPAM 2 MG: 1 TABLET ORAL at 17:49

## 2018-11-21 RX ADMIN — SODIUM CHLORIDE: 9 INJECTION, SOLUTION INTRAVENOUS at 04:48

## 2018-11-21 RX ADMIN — LORAZEPAM 3 MG: 1 TABLET ORAL at 13:50

## 2018-11-21 RX ADMIN — FOLIC ACID 1 MG: 1 TABLET ORAL at 06:16

## 2018-11-21 RX ADMIN — ENOXAPARIN SODIUM 40 MG: 100 INJECTION SUBCUTANEOUS at 06:16

## 2018-11-21 RX ADMIN — ALBUTEROL SULFATE 2 PUFF: 90 AEROSOL, METERED RESPIRATORY (INHALATION) at 09:07

## 2018-11-21 RX ADMIN — LORAZEPAM 1 MG: 1 TABLET ORAL at 08:39

## 2018-11-21 RX ADMIN — CLONIDINE HYDROCHLORIDE 0.1 MG: 0.1 TABLET ORAL at 18:32

## 2018-11-21 RX ADMIN — LORAZEPAM 2 MG: 1 TABLET ORAL at 00:09

## 2018-11-21 RX ADMIN — GABAPENTIN 300 MG: 300 CAPSULE ORAL at 06:16

## 2018-11-21 RX ADMIN — LORAZEPAM 2 MG: 1 TABLET ORAL at 09:06

## 2018-11-21 RX ADMIN — LORAZEPAM 1 MG: 1 TABLET ORAL at 12:46

## 2018-11-21 RX ADMIN — LORAZEPAM 2 MG: 1 TABLET ORAL at 15:44

## 2018-11-21 RX ADMIN — LORAZEPAM 1 MG: 1 TABLET ORAL at 19:41

## 2018-11-21 RX ADMIN — LORAZEPAM 1 MG: 1 TABLET ORAL at 04:12

## 2018-11-21 RX ADMIN — FLUOXETINE HYDROCHLORIDE 20 MG: 20 CAPSULE ORAL at 06:16

## 2018-11-21 RX ADMIN — THERA TABS 1 TABLET: TAB at 06:16

## 2018-11-21 RX ADMIN — GABAPENTIN 300 MG: 300 CAPSULE ORAL at 18:32

## 2018-11-21 ASSESSMENT — LIFESTYLE VARIABLES
AUDITORY DISTURBANCES: NOT PRESENT
PAROXYSMAL SWEATS: *
AUDITORY DISTURBANCES: NOT PRESENT
ORIENTATION AND CLOUDING OF SENSORIUM: ORIENTED AND CAN DO SERIAL ADDITIONS
PAROXYSMAL SWEATS: *
HEADACHE, FULLNESS IN HEAD: VERY MILD
TREMOR: *
VISUAL DISTURBANCES: NOT PRESENT
AUDITORY DISTURBANCES: NOT PRESENT
TOTAL SCORE: VERY MILD ITCHING, PINS AND NEEDLES SENSATION, BURNING OR NUMBNESS
HEADACHE, FULLNESS IN HEAD: VERY MILD
TREMOR: NO TREMOR
VISUAL DISTURBANCES: NOT PRESENT
TOTAL SCORE: VERY MILD ITCHING, PINS AND NEEDLES SENSATION, BURNING OR NUMBNESS
HEADACHE, FULLNESS IN HEAD: VERY MILD
TOTAL SCORE: 13
ANXIETY: MODERATELY ANXIOUS OR GUARDED, SO ANXIETY IS INFERRED
HEADACHE, FULLNESS IN HEAD: NOT PRESENT
VISUAL DISTURBANCES: NOT PRESENT
TREMOR: *
AUDITORY DISTURBANCES: NOT PRESENT
ANXIETY: NO ANXIETY (AT EASE)
ORIENTATION AND CLOUDING OF SENSORIUM: ORIENTED AND CAN DO SERIAL ADDITIONS
AUDITORY DISTURBANCES: NOT PRESENT
ORIENTATION AND CLOUDING OF SENSORIUM: ORIENTED AND CAN DO SERIAL ADDITIONS
VISUAL DISTURBANCES: NOT PRESENT
ANXIETY: MODERATELY ANXIOUS OR GUARDED, SO ANXIETY IS INFERRED
TREMOR: TREMOR NOT VISIBLE BUT CAN BE FELT, FINGERTIP TO FINGERTIP
ORIENTATION AND CLOUDING OF SENSORIUM: ORIENTED AND CAN DO SERIAL ADDITIONS
ANXIETY: MODERATELY ANXIOUS OR GUARDED, SO ANXIETY IS INFERRED
AGITATION: SOMEWHAT MORE THAN NORMAL ACTIVITY
PAROXYSMAL SWEATS: *
ORIENTATION AND CLOUDING OF SENSORIUM: ORIENTED AND CAN DO SERIAL ADDITIONS
ANXIETY: *
ANXIETY: *
AUDITORY DISTURBANCES: NOT PRESENT
AGITATION: SOMEWHAT MORE THAN NORMAL ACTIVITY
HEADACHE, FULLNESS IN HEAD: VERY MILD
NAUSEA AND VOMITING: *
TOTAL SCORE: VERY MILD ITCHING, PINS AND NEEDLES SENSATION, BURNING OR NUMBNESS
VISUAL DISTURBANCES: NOT PRESENT
TREMOR: TREMOR NOT VISIBLE BUT CAN BE FELT, FINGERTIP TO FINGERTIP
NAUSEA AND VOMITING: NO NAUSEA AND NO VOMITING
TOTAL SCORE: 15
ANXIETY: *
AGITATION: *
NAUSEA AND VOMITING: MILD NAUSEA WITH NO VOMITING
AGITATION: *
PAROXYSMAL SWEATS: *
ORIENTATION AND CLOUDING OF SENSORIUM: ORIENTED AND CAN DO SERIAL ADDITIONS
NAUSEA AND VOMITING: NO NAUSEA AND NO VOMITING
TOTAL SCORE: 13
TREMOR: TREMOR NOT VISIBLE BUT CAN BE FELT, FINGERTIP TO FINGERTIP
TOTAL SCORE: VERY MILD ITCHING, PINS AND NEEDLES SENSATION, BURNING OR NUMBNESS
TOTAL SCORE: 11
TOTAL SCORE: 10
ORIENTATION AND CLOUDING OF SENSORIUM: ORIENTED AND CAN DO SERIAL ADDITIONS
AUDITORY DISTURBANCES: NOT PRESENT
AGITATION: NORMAL ACTIVITY
ANXIETY: MODERATELY ANXIOUS OR GUARDED, SO ANXIETY IS INFERRED
AUDITORY DISTURBANCES: NOT PRESENT
HEADACHE, FULLNESS IN HEAD: VERY MILD
NAUSEA AND VOMITING: NO NAUSEA AND NO VOMITING
VISUAL DISTURBANCES: NOT PRESENT
TOTAL SCORE: 3
ORIENTATION AND CLOUDING OF SENSORIUM: ORIENTED AND CAN DO SERIAL ADDITIONS
AGITATION: SOMEWHAT MORE THAN NORMAL ACTIVITY
AGITATION: *
AGITATION: *
ANXIETY: MILDLY ANXIOUS
VISUAL DISTURBANCES: NOT PRESENT
VISUAL DISTURBANCES: NOT PRESENT
PAROXYSMAL SWEATS: BARELY PERCEPTIBLE SWEATING. PALMS MOIST
TREMOR: *
TOTAL SCORE: VERY MILD ITCHING, PINS AND NEEDLES SENSATION, BURNING OR NUMBNESS
VISUAL DISTURBANCES: NOT PRESENT
AGITATION: SOMEWHAT MORE THAN NORMAL ACTIVITY
NAUSEA AND VOMITING: NO NAUSEA AND NO VOMITING
NAUSEA AND VOMITING: NO NAUSEA AND NO VOMITING
VISUAL DISTURBANCES: NOT PRESENT
AUDITORY DISTURBANCES: NOT PRESENT
HEADACHE, FULLNESS IN HEAD: VERY MILD
AGITATION: *
PAROXYSMAL SWEATS: *
HEADACHE, FULLNESS IN HEAD: VERY MILD
PAROXYSMAL SWEATS: BEADS OF SWEAT OBVIOUS ON FOREHEAD
TOTAL SCORE: VERY MILD ITCHING, PINS AND NEEDLES SENSATION, BURNING OR NUMBNESS
HEADACHE, FULLNESS IN HEAD: VERY MILD
TOTAL SCORE: 11
ORIENTATION AND CLOUDING OF SENSORIUM: ORIENTED AND CAN DO SERIAL ADDITIONS
ANXIETY: MODERATELY ANXIOUS OR GUARDED, SO ANXIETY IS INFERRED
NAUSEA AND VOMITING: MILD NAUSEA WITH NO VOMITING
TREMOR: TREMOR NOT VISIBLE BUT CAN BE FELT, FINGERTIP TO FINGERTIP
ANXIETY: MODERATELY ANXIOUS OR GUARDED, SO ANXIETY IS INFERRED
NAUSEA AND VOMITING: MILD NAUSEA WITH NO VOMITING
NAUSEA AND VOMITING: MILD NAUSEA WITH NO VOMITING
AUDITORY DISTURBANCES: NOT PRESENT
PAROXYSMAL SWEATS: *
HEADACHE, FULLNESS IN HEAD: VERY MILD
ORIENTATION AND CLOUDING OF SENSORIUM: ORIENTED AND CAN DO SERIAL ADDITIONS
HEADACHE, FULLNESS IN HEAD: VERY MILD
TOTAL SCORE: 11
PAROXYSMAL SWEATS: BARELY PERCEPTIBLE SWEATING. PALMS MOIST
TREMOR: *
VISUAL DISTURBANCES: NOT PRESENT
AUDITORY DISTURBANCES: NOT PRESENT
NAUSEA AND VOMITING: NO NAUSEA AND NO VOMITING
TOTAL SCORE: VERY MILD ITCHING, PINS AND NEEDLES SENSATION, BURNING OR NUMBNESS
TOTAL SCORE: 8
ORIENTATION AND CLOUDING OF SENSORIUM: ORIENTED AND CAN DO SERIAL ADDITIONS
PAROXYSMAL SWEATS: BARELY PERCEPTIBLE SWEATING. PALMS MOIST
PAROXYSMAL SWEATS: BARELY PERCEPTIBLE SWEATING. PALMS MOIST
TOTAL SCORE: 10
AGITATION: SOMEWHAT MORE THAN NORMAL ACTIVITY
TOTAL SCORE: 10

## 2018-11-21 ASSESSMENT — PAIN SCALES - GENERAL
PAINLEVEL_OUTOF10: 0

## 2018-11-21 ASSESSMENT — ENCOUNTER SYMPTOMS
FEVER: 0
COUGH: 0
VOMITING: 0
NERVOUS/ANXIOUS: 1
DIAPHORESIS: 1
SHORTNESS OF BREATH: 0
NAUSEA: 1
CHILLS: 0
PALPITATIONS: 0
BLOOD IN STOOL: 0
TREMORS: 1
DIZZINESS: 0
ABDOMINAL PAIN: 0

## 2018-11-21 NOTE — PROGRESS NOTES
Pt transported to [facility]. All belongings with patient. Bedside report given to RADHA Garber. All questions answered.

## 2018-11-21 NOTE — PROGRESS NOTES
Received report from night staff. Assumed pt care. Pain level 0/10. AOX4. POC discussed. CIWA protocol being followed. Medical patient, no tele monitor in place. Call light within reach, bed in lowest position, and personal items accessible.

## 2018-11-21 NOTE — CARE PLAN
Problem: Safety  Goal: Will remain free from falls  Safety precautions reviewed with pt, pt verbalized understanding and denies questions.  Pt demonstrated ability to use call light for assistance.      Problem: Bowel/Gastric:  Goal: Normal bowel function is maintained or improved  Pt having normal/regular BMs per pt baseline      Problem: Pain Management  Goal: Pain level will decrease to patient's comfort goal  Q4 pain assessments in place. Pt educated on pain scale. RN aware of pt's goal pain. PRN medication orders followed.

## 2018-11-21 NOTE — PROGRESS NOTES
Internal Medicine Interval Note  Note Author: Flaco Ren M.D.     Name Jef Spence       1980   Age/Sex 38 y.o. male   MRN [id number]   Code Status Full     After 5PM or if no immediate response to page, please call for cross-coverage  Attending/Team: Isaiah Lang See Patient List for primary contact information  Call [phone] to page    1st Call - Day Intern (R1):   Dr. Ren 2nd Call - Day Sr. Resident (R2/R3):   Dr. Betancourt         Reason for interval visit  (Principal Problem)   Alcohol Detox/withdrawal      Interval Problem Daily Status Update  (24 hours, problem oriented, brief subjective history, new lab/imaging data pertinent to that problem)     -overnight patient CIWA scores range 13-10. With scores for diaphoresis, anxiety, agitation.  No seizure like activity.  Vital signs within normal limits.    -upon morning assessment patient asleep, diaphoretic.  Unable to arouse with moderate stimuli.     -later beside assessment 11pm - patient not exhibit symptoms of diaphoresis, agitation, anxiety initially upon entering room.  Denies suicidal ideation.  However, does not want to d/c to group home as he believes detrimental to mental condition.      Review of Systems   Constitutional: Positive for diaphoresis. Negative for chills and fever.   Respiratory: Negative for cough and shortness of breath.    Cardiovascular: Negative for chest pain and palpitations.   Gastrointestinal: Positive for nausea. Negative for abdominal pain, blood in stool and vomiting.   Genitourinary: Negative for dysuria.   Neurological: Positive for tremors. Negative for dizziness.   Psychiatric/Behavioral: The patient is nervous/anxious.        Disposition/Barriers to discharge:   Home, with resources for rehab  No anticipated barriers to discharge    Consultants/Specialty  none  PCP: No primary care provider on file.      Quality Measures  Quality-Core Measures   Reviewed items::  Labs reviewed and Medications  reviewed  Johnson catheter::  No Johnson  DVT prophylaxis pharmacological::  Enoxaparin (Lovenox)          Physical Exam       Vitals:    11/20/18 1258 11/20/18 2113 11/21/18 0416 11/21/18 0907   BP: 129/86 121/86 115/78 111/92   Pulse: 79 61 69 83   Resp: 18 16 16 17   Temp: 36.6 °C (97.8 °F) 36.8 °C (98.2 °F) 36.6 °C (97.8 °F) 36.7 °C (98 °F)   TempSrc: Temporal Temporal Temporal Temporal   SpO2: 95% 97% 94% 95%   Weight:  83 kg (182 lb 15.7 oz)     Height:         Body mass index is 27.02 kg/m². Weight: 83 kg (182 lb 15.7 oz)  Oxygen Therapy:  Pulse Oximetry: 95 %, O2 (LPM): 0, O2 Delivery: None (Room Air)    Physical Exam   Constitutional: He is oriented to person, place, and time and well-developed, well-nourished, and in no distress.   HENT:   Head: Normocephalic and atraumatic.   Eyes: Pupils are equal, round, and reactive to light. EOM are normal.   Neck: Neck supple. No thyromegaly present.   Cardiovascular: Normal rate, regular rhythm and normal heart sounds.  Exam reveals no gallop and no friction rub.    No murmur heard.  Pulmonary/Chest: Effort normal and breath sounds normal. No respiratory distress. He has no wheezes.   Abdominal: Soft. Bowel sounds are normal. He exhibits no distension.   Musculoskeletal: Normal range of motion. He exhibits no edema, tenderness or deformity.   Neurological: He is alert and oriented to person, place, and time.   Skin: Skin is warm and dry.   Psychiatric: Affect normal.         Assessment/Plan     * Alcohol abuse- (present on admission)   Assessment & Plan    Patient presented requesting detox, no h/o DTs seizures but drinks 2-3 pints daily of whiskey.  Presented to ED immediately after drinking claiming wanting to detox.  Etoh level 0.29 on admit  Denies any other drug use  Attempted to quit multiple times, requesting assistance  Stated SI to EDP, but currently denies suicidal ideation    Plan:   -CIWA protocol, continuous pulse ox  -scheduled clonidine for  hyper-adrenergic symptoms  -valproate for further sedation and decrease benzo use  -continue gabapentin and fluoxetine at home doses to avoid CNS withdrawal hyperactivity  -consider psych consult only if patient acutely active suicidal ideation  -po thiamine/folate/MV     Anxiety   Assessment & Plan    Continue home prozac     Insomnia- (present on admission)   Assessment & Plan    Continue home trazodone     Arthritis- (present on admission)   Assessment & Plan    Continue home diclofenac and gabapentin     Asthma   Assessment & Plan    Albuterol prn

## 2018-11-22 PROBLEM — F32.A DEPRESSIVE DISORDER: Status: ACTIVE | Noted: 2018-11-22

## 2018-11-22 PROBLEM — R45.851 SUICIDAL IDEATIONS: Status: ACTIVE | Noted: 2018-11-22

## 2018-11-22 LAB
ALBUMIN SERPL BCP-MCNC: 3.7 G/DL (ref 3.2–4.9)
ALBUMIN/GLOB SERPL: 1.5 G/DL
ALP SERPL-CCNC: 70 U/L (ref 30–99)
ALT SERPL-CCNC: 14 U/L (ref 2–50)
ANION GAP SERPL CALC-SCNC: 9 MMOL/L (ref 0–11.9)
AST SERPL-CCNC: 15 U/L (ref 12–45)
BASOPHILS # BLD AUTO: 0.6 % (ref 0–1.8)
BASOPHILS # BLD: 0.05 K/UL (ref 0–0.12)
BILIRUB SERPL-MCNC: 0.6 MG/DL (ref 0.1–1.5)
BUN SERPL-MCNC: 8 MG/DL (ref 8–22)
CALCIUM SERPL-MCNC: 8.7 MG/DL (ref 8.5–10.5)
CHLORIDE SERPL-SCNC: 107 MMOL/L (ref 96–112)
CO2 SERPL-SCNC: 22 MMOL/L (ref 20–33)
CREAT SERPL-MCNC: 0.99 MG/DL (ref 0.5–1.4)
EKG IMPRESSION: NORMAL
EOSINOPHIL # BLD AUTO: 0.25 K/UL (ref 0–0.51)
EOSINOPHIL NFR BLD: 2.9 % (ref 0–6.9)
ERYTHROCYTE [DISTWIDTH] IN BLOOD BY AUTOMATED COUNT: 39.4 FL (ref 35.9–50)
GLOBULIN SER CALC-MCNC: 2.5 G/DL (ref 1.9–3.5)
GLUCOSE SERPL-MCNC: 94 MG/DL (ref 65–99)
HCT VFR BLD AUTO: 43.4 % (ref 42–52)
HGB BLD-MCNC: 15 G/DL (ref 14–18)
IMM GRANULOCYTES # BLD AUTO: 0.04 K/UL (ref 0–0.11)
IMM GRANULOCYTES NFR BLD AUTO: 0.5 % (ref 0–0.9)
LYMPHOCYTES # BLD AUTO: 2.34 K/UL (ref 1–4.8)
LYMPHOCYTES NFR BLD: 26.9 % (ref 22–41)
MAGNESIUM SERPL-MCNC: 1.9 MG/DL (ref 1.5–2.5)
MCH RBC QN AUTO: 32.5 PG (ref 27–33)
MCHC RBC AUTO-ENTMCNC: 34.6 G/DL (ref 33.7–35.3)
MCV RBC AUTO: 94.1 FL (ref 81.4–97.8)
MONOCYTES # BLD AUTO: 0.83 K/UL (ref 0–0.85)
MONOCYTES NFR BLD AUTO: 9.5 % (ref 0–13.4)
NEUTROPHILS # BLD AUTO: 5.2 K/UL (ref 1.82–7.42)
NEUTROPHILS NFR BLD: 59.6 % (ref 44–72)
NRBC # BLD AUTO: 0 K/UL
NRBC BLD-RTO: 0 /100 WBC
PHOSPHATE SERPL-MCNC: 3.7 MG/DL (ref 2.5–4.5)
PLATELET # BLD AUTO: 193 K/UL (ref 164–446)
PMV BLD AUTO: 9.4 FL (ref 9–12.9)
POTASSIUM SERPL-SCNC: 3.9 MMOL/L (ref 3.6–5.5)
PROT SERPL-MCNC: 6.2 G/DL (ref 6–8.2)
RBC # BLD AUTO: 4.61 M/UL (ref 4.7–6.1)
SODIUM SERPL-SCNC: 138 MMOL/L (ref 135–145)
WBC # BLD AUTO: 8.7 K/UL (ref 4.8–10.8)

## 2018-11-22 PROCEDURE — A9270 NON-COVERED ITEM OR SERVICE: HCPCS | Performed by: STUDENT IN AN ORGANIZED HEALTH CARE EDUCATION/TRAINING PROGRAM

## 2018-11-22 PROCEDURE — 770001 HCHG ROOM/CARE - MED/SURG/GYN PRIV*

## 2018-11-22 PROCEDURE — 93005 ELECTROCARDIOGRAM TRACING: CPT | Performed by: STUDENT IN AN ORGANIZED HEALTH CARE EDUCATION/TRAINING PROGRAM

## 2018-11-22 PROCEDURE — A9270 NON-COVERED ITEM OR SERVICE: HCPCS | Performed by: INTERNAL MEDICINE

## 2018-11-22 PROCEDURE — 80053 COMPREHEN METABOLIC PANEL: CPT

## 2018-11-22 PROCEDURE — 700102 HCHG RX REV CODE 250 W/ 637 OVERRIDE(OP): Performed by: STUDENT IN AN ORGANIZED HEALTH CARE EDUCATION/TRAINING PROGRAM

## 2018-11-22 PROCEDURE — 83735 ASSAY OF MAGNESIUM: CPT

## 2018-11-22 PROCEDURE — 93010 ELECTROCARDIOGRAM REPORT: CPT | Performed by: INTERNAL MEDICINE

## 2018-11-22 PROCEDURE — 85025 COMPLETE CBC W/AUTO DIFF WBC: CPT

## 2018-11-22 PROCEDURE — 84100 ASSAY OF PHOSPHORUS: CPT

## 2018-11-22 PROCEDURE — 700102 HCHG RX REV CODE 250 W/ 637 OVERRIDE(OP): Performed by: INTERNAL MEDICINE

## 2018-11-22 PROCEDURE — 36415 COLL VENOUS BLD VENIPUNCTURE: CPT

## 2018-11-22 PROCEDURE — 99233 SBSQ HOSP IP/OBS HIGH 50: CPT | Mod: GC | Performed by: INTERNAL MEDICINE

## 2018-11-22 RX ORDER — HYDROXYZINE PAMOATE 25 MG/1
25 CAPSULE ORAL EVERY 6 HOURS PRN
Status: DISCONTINUED | OUTPATIENT
Start: 2018-11-22 | End: 2018-11-27

## 2018-11-22 RX ORDER — LORAZEPAM 0.5 MG/1
0.5 TABLET ORAL ONCE
Status: COMPLETED | OUTPATIENT
Start: 2018-11-22 | End: 2018-11-22

## 2018-11-22 RX ORDER — TRAZODONE HYDROCHLORIDE 150 MG/1
150 TABLET ORAL NIGHTLY
Status: DISCONTINUED | OUTPATIENT
Start: 2018-11-22 | End: 2018-11-29 | Stop reason: HOSPADM

## 2018-11-22 RX ORDER — LORAZEPAM 2 MG/ML
1 CONCENTRATE ORAL
Status: DISCONTINUED | OUTPATIENT
Start: 2018-11-22 | End: 2018-11-23

## 2018-11-22 RX ORDER — CLONIDINE HYDROCHLORIDE 0.1 MG/1
0.1 TABLET ORAL 3 TIMES DAILY
Status: DISCONTINUED | OUTPATIENT
Start: 2018-11-22 | End: 2018-11-24

## 2018-11-22 RX ORDER — FLUOXETINE 10 MG/1
30 CAPSULE ORAL DAILY
Status: DISCONTINUED | OUTPATIENT
Start: 2018-11-23 | End: 2018-11-29 | Stop reason: HOSPADM

## 2018-11-22 RX ADMIN — CLONIDINE HYDROCHLORIDE 0.1 MG: 0.1 TABLET ORAL at 06:35

## 2018-11-22 RX ADMIN — HYDROXYZINE PAMOATE 25 MG: 25 CAPSULE ORAL at 22:16

## 2018-11-22 RX ADMIN — LORAZEPAM 1 MG: 1 TABLET ORAL at 00:24

## 2018-11-22 RX ADMIN — HYDROXYZINE PAMOATE 25 MG: 25 CAPSULE ORAL at 15:29

## 2018-11-22 RX ADMIN — VALPROIC ACID 250 MG: 250 SOLUTION ORAL at 16:43

## 2018-11-22 RX ADMIN — GABAPENTIN 300 MG: 300 CAPSULE ORAL at 06:35

## 2018-11-22 RX ADMIN — LORAZEPAM 2 MG: 1 TABLET ORAL at 02:38

## 2018-11-22 RX ADMIN — THIAMINE HCL TAB 100 MG 100 MG: 100 TAB at 06:35

## 2018-11-22 RX ADMIN — FOLIC ACID 1 MG: 1 TABLET ORAL at 06:35

## 2018-11-22 RX ADMIN — CLONIDINE HYDROCHLORIDE 0.1 MG: 0.1 TABLET ORAL at 11:11

## 2018-11-22 RX ADMIN — TRAZODONE HYDROCHLORIDE 150 MG: 150 TABLET ORAL at 22:10

## 2018-11-22 RX ADMIN — FLUOXETINE HYDROCHLORIDE 20 MG: 20 CAPSULE ORAL at 06:35

## 2018-11-22 RX ADMIN — GABAPENTIN 300 MG: 300 CAPSULE ORAL at 11:11

## 2018-11-22 RX ADMIN — CLONIDINE HYDROCHLORIDE 0.1 MG: 0.1 TABLET ORAL at 16:43

## 2018-11-22 RX ADMIN — THERA TABS 1 TABLET: TAB at 06:35

## 2018-11-22 RX ADMIN — NICOTINE POLACRILEX 2 MG: 2 GUM, CHEWING BUCCAL at 19:50

## 2018-11-22 RX ADMIN — GABAPENTIN 300 MG: 300 CAPSULE ORAL at 16:43

## 2018-11-22 RX ADMIN — VALPROIC ACID 250 MG: 250 SOLUTION ORAL at 06:35

## 2018-11-22 RX ADMIN — LORAZEPAM 0.5 MG: 0.5 TABLET ORAL at 10:42

## 2018-11-22 ASSESSMENT — LIFESTYLE VARIABLES
NAUSEA AND VOMITING: NO NAUSEA AND NO VOMITING
AGITATION: NORMAL ACTIVITY
HEADACHE, FULLNESS IN HEAD: VERY MILD
ANXIETY: MILDLY ANXIOUS
ORIENTATION AND CLOUDING OF SENSORIUM: ORIENTED AND CAN DO SERIAL ADDITIONS
TREMOR: TREMOR NOT VISIBLE BUT CAN BE FELT, FINGERTIP TO FINGERTIP
TOTAL SCORE: MILD ITCHING, PINS AND NEEDLES SENSATION, BURNING OR NUMBNESS
AUDITORY DISTURBANCES: NOT PRESENT
AUDITORY DISTURBANCES: NOT PRESENT
TREMOR: NO TREMOR
ORIENTATION AND CLOUDING OF SENSORIUM: ORIENTED AND CAN DO SERIAL ADDITIONS
TOTAL SCORE: 2
PAROXYSMAL SWEATS: BARELY PERCEPTIBLE SWEATING. PALMS MOIST
PAROXYSMAL SWEATS: BARELY PERCEPTIBLE SWEATING. PALMS MOIST
HEADACHE, FULLNESS IN HEAD: NOT PRESENT
VISUAL DISTURBANCES: NOT PRESENT
AGITATION: NORMAL ACTIVITY
ANXIETY: *
ANXIETY: MILDLY ANXIOUS
ORIENTATION AND CLOUDING OF SENSORIUM: ORIENTED AND CAN DO SERIAL ADDITIONS
NAUSEA AND VOMITING: MILD NAUSEA WITH NO VOMITING
HEADACHE, FULLNESS IN HEAD: VERY MILD
HEADACHE, FULLNESS IN HEAD: NOT PRESENT
TOTAL SCORE: 8
AGITATION: NORMAL ACTIVITY
AUDITORY DISTURBANCES: NOT PRESENT
VISUAL DISTURBANCES: NOT PRESENT
AGITATION: NORMAL ACTIVITY
ORIENTATION AND CLOUDING OF SENSORIUM: ORIENTED AND CAN DO SERIAL ADDITIONS
HEADACHE, FULLNESS IN HEAD: NOT PRESENT
PAROXYSMAL SWEATS: BARELY PERCEPTIBLE SWEATING. PALMS MOIST
HEADACHE, FULLNESS IN HEAD: VERY MILD
AUDITORY DISTURBANCES: NOT PRESENT
ANXIETY: *
AGITATION: NORMAL ACTIVITY
TOTAL SCORE: 2
ORIENTATION AND CLOUDING OF SENSORIUM: ORIENTED AND CAN DO SERIAL ADDITIONS
NAUSEA AND VOMITING: NO NAUSEA AND NO VOMITING
TREMOR: TREMOR NOT VISIBLE BUT CAN BE FELT, FINGERTIP TO FINGERTIP
VISUAL DISTURBANCES: MILD SENSITIVITY
ORIENTATION AND CLOUDING OF SENSORIUM: ORIENTED AND CAN DO SERIAL ADDITIONS
AGITATION: NORMAL ACTIVITY
TREMOR: *
TOTAL SCORE: 13
TREMOR: NO TREMOR
TREMOR: NO TREMOR
PAROXYSMAL SWEATS: BARELY PERCEPTIBLE SWEATING. PALMS MOIST
ANXIETY: MILDLY ANXIOUS
NAUSEA AND VOMITING: MILD NAUSEA WITH NO VOMITING
VISUAL DISTURBANCES: VERY MILD SENSITIVITY
NAUSEA AND VOMITING: MILD NAUSEA WITH NO VOMITING
TOTAL SCORE: 4
PAROXYSMAL SWEATS: BARELY PERCEPTIBLE SWEATING. PALMS MOIST
NAUSEA AND VOMITING: NO NAUSEA AND NO VOMITING
VISUAL DISTURBANCES: MILD SENSITIVITY
HEADACHE, FULLNESS IN HEAD: NOT PRESENT
TOTAL SCORE: 2
ORIENTATION AND CLOUDING OF SENSORIUM: ORIENTED AND CAN DO SERIAL ADDITIONS
VISUAL DISTURBANCES: NOT PRESENT
PAROXYSMAL SWEATS: BARELY PERCEPTIBLE SWEATING. PALMS MOIST
PAROXYSMAL SWEATS: *
AUDITORY DISTURBANCES: NOT PRESENT
ANXIETY: MILDLY ANXIOUS
AUDITORY DISTURBANCES: NOT PRESENT
TREMOR: NO TREMOR
ANXIETY: MILDLY ANXIOUS
VISUAL DISTURBANCES: NOT PRESENT
TOTAL SCORE: 4
NAUSEA AND VOMITING: NO NAUSEA AND NO VOMITING
AGITATION: NORMAL ACTIVITY
AUDITORY DISTURBANCES: NOT PRESENT

## 2018-11-22 ASSESSMENT — ENCOUNTER SYMPTOMS
BLOOD IN STOOL: 0
PALPITATIONS: 0
COUGH: 0
FEVER: 0
VOMITING: 0
CHILLS: 0
ABDOMINAL PAIN: 0
DIZZINESS: 0
TREMORS: 1
SHORTNESS OF BREATH: 0
NERVOUS/ANXIOUS: 1

## 2018-11-22 ASSESSMENT — PAIN SCALES - GENERAL
PAINLEVEL_OUTOF10: 0

## 2018-11-22 NOTE — PROGRESS NOTES
Pt. Now on legal hold. Called [facility] resident on call and received call back from MD TORRES. Per MD TORRES, pt. Can have personal belongings and telephone privileges as long as pt. Has a sitter all the time at bedside.

## 2018-11-22 NOTE — PSYCHIATRY
BRIEF PSYCHIATRIC CONSULT NOTE: patient seen, full note to follow.  -Legal Hold:extended  -Sitter:yes  -Restrictions:   -phone:yes   -visitors:yes   -personal items: yes   -finger foods required:    -personal clothes:   -Orders Placed: routine  -Plan:continue to follow

## 2018-11-22 NOTE — PROGRESS NOTES
RN to medicate patient for anxiety. Pt. Currently sleeping and appears to be comfortable. Will re-assess and intervene as necessary.

## 2018-11-22 NOTE — PROGRESS NOTES
2 Rn skin check done with RADHA Cortes    -skin is warm, dry and intact  -healing rash on bilateral knuckles, pink and blanchable  -sacrum red, blanchable     Interventions: pt turns self, encourage frequent reposition

## 2018-11-22 NOTE — DISCHARGE PLANNING
Anticipated Discharge Disposition: mental health facility    Action: Dr. Ren placed patient on legal hold for suicidal thoughts. Not medically clear at this time.  Faxed legal home to legal hold [facility]      Barriers to Discharge: medical clearance and mental health acceptance    Plan: follow up with Dr. Ren to see when patient may be medically clear to d/c.

## 2018-11-22 NOTE — PROGRESS NOTES
Pt arrived to room 630-2, A&Ox4. Pt uses call bell appropriately, nonskid socks on, steady gait. Pt showered. Ativan given per BERTHA. Pt major concern about where he will go after he leaves the hospital. Belongings and call light within reach. Pt resting in bed.

## 2018-11-22 NOTE — PSYCHIATRY
"PSYCHIATRIC CONSULTATION:  Reason for admission:  38 year old male admitted for detoxification    Reason for consult: Suicidal Ideation   Requesting Physician: Flaco Ren M.D.     Legal status:   On hold, extended    Chief Complaint: \"I just don't feel like anything is worth living for\"      HPI:   38 year old male with history of  Alcohol use disorder and anxiety initially presented for alcohol detoxification. In the ED he voiced suicidal ideation, denied this at time of admission. Patient indicated suicidal ideation today at which time Psychiatry was consulted.     Patient states he feels hopeless at this time and notes that he does not have a reason to live anymore because he does not have a place to live and has done \"anything good I can do in my life\". He states the only reason he can think of to continue living is \"to drink but then that will just piss off my parents and make me feel worse.\" He repeatedly states \"I just need help\" during interview. He states he would plan to do \"something I can't tell you about\" or sell his property in [facility] and \"drink and do drugs until I just .\" He states he will definitively do this if he has to go to the shelter but feels that he might regardless due to \"nothing really being worth living for.\"   He notes a depressed mood that has been present for several weeks, anhedonia, increased guilt, poor sleep although he states medication aids with this. He states he has been taking Prozac consistently for the last few years but does not feel benefit. He has previously tapered himself off before but has experienced worsening depression following this.   He has been living with his parents since September, states he intermittently lives with them as \"a stop that lasts too long.\" They are going out of town for a few weeks and he is not welcome to stay there while they are gone. He does not feel returning to their home is an option and states he does not feel safe within their " "home. He presents guarded throughout the interview often stating \"there's things I just can't tell you.\" When further prompted, he notes that these are things \"no one would every believe I've done.\" He feels safe within the hospital at this time, he notes periods in the past where he has been sought after and has felt unsafe although he states he hasn't \"made anyone angry enough to come after me recently.\"    When seen with attending physician, Dr. Le, patient stated that he might not end his life right now. He was noted to tell nursing staff he was not suicidal as well. He did state he felt he would be able to end his life when he chose to with plan of drinking large amounts. He states he would fly to [place] or [facility] if he did not have a plan after discharge, it is unclear how patient would obtain resources for this at this time. He notes he already has a plane ticket to [place]. He states his depression is 8/10 with 10 being the worst depression he has felt and he normally feels it is at a 5. He notes continued feelings of hopelessness. He is unable to state what he would continue to live for although he states he would like to do \"a couple things before I go\". He feels he has felt depressed since he was a child and has had depressed mood during periods of sobriety as well. He notes that alcohol does worsen his depression, but is also the only time he has felt happy. He states he cannot recall the last time he has felt happy without the use of alcohol.     Psychiatric Review of Systems:current symptoms as reported by pt.  Depression: as noted in HPI       Natalie: denies   Anxiety/Panic Attacks endorses mild anxiety   PTSD symptom: denies   Psychosis: denies        Medical Review of Systems: as reported by pt. All systems reviewed. Only those found to be + are noted below. All others are negative.   Neurological:    TBIs: Denies   SZs: Denies   Strokes: Denies  Other medical symptoms:   Thyroid: " "Denies   Diabetes: Denies    Cardiovascular disease: Denies     Psychiatric Examination: observed phenomenon:  Vitals:   Vitals:    11/21/18 1832 11/21/18 1915 11/22/18 0310 11/22/18 0810   BP: 135/101 121/90 110/64 119/85   Pulse: 97 84 64 74   Resp:  18 18 16   Temp:  37.1 °C (98.7 °F) 36.4 °C (97.6 °F) 36.4 °C (97.5 °F)   TempSrc:  Temporal Temporal Temporal   SpO2:  95% 94% 95%   Weight:       Height:         Musculoskeletal: psychomotor agitation present with patient often moving in bed sitting up and down   Appearance: 38 year old male appropriate to stated age in hospital attire sitting in bed   Thoughts: linear, logical  - presents guarded often stating \"I can't say that\" and returning attention to this writer   Speech: regular volume, regular rate, no slurring of speech, no stuttering   Mood: \"terrible\"     Affect: Flat, depressed      SI/HI: endorses/denies  Attention/Alertness: Alert and Attentive     Memory: appears in tact       Orientation: oriented to self, place, date and situation       Fund of Knowledge: in tact       Insight/Judgement into symptoms: limited/poor     Past Psychiatric Hx:   History of Depression - states he was diagnosed when he was a teenager and at that time had trials with Celexa and Wellbutrin. He did not tolerate Wellbutrin well stated it made him angry and he ran away from home while on it  Denies previous psychiatric hospitalization  Denies previous suicide attempts    Family Psychiatric Hx:   Family history of Depression and Alcohol Use Disorder  - father's parents    Social Hx:   Patient is currently unemployed. He has been living with his parents since September, prior to this he was living in [place] for 3 months, he was previously living in a sober living facility as well. He is currently unemployed and states he has no source of income. He does not have PCP or Psychiatrist available.     Drug/Alcohol/Tobacco Hx:   Drugs: Denies current use - states he used Cocaine " daily from [id number]   Alcohol: Drinks 1-2 liters of Whiskey daily, first use when he was a teenager - states use increased in College and continued to increase following this. He is unsure of his longest period of sobriety. He has been to two rehabilitation facilities previously. He was in [facility] for a 28 day program and states he maintained sobriety for approximately 2 weeks following this.    Tobacco: smokes approximately 10 cigarettes per day    Medical Hx: labs, MARS, medications, etc were reviewed. Only those findings of potential interest to psychiatry are noted below:    Allergies: Patient has no known allergies.  Medications: [unfilled]  Labs:   Lab Results   Component Value Date/Time    SODIUM 138 11/22/2018 03:29 AM    POTASSIUM 3.9 11/22/2018 03:29 AM    CHLORIDE 107 11/22/2018 03:29 AM    CO2 22 11/22/2018 03:29 AM    GLUCOSE 94 11/22/2018 03:29 AM    BUN 8 11/22/2018 03:29 AM    CREATININE 0.99 11/22/2018 03:29 AM      ECG: QTc 443    Cranial Imaging:   None Available     ASSESSMENT:   Unspecified Depressive Disorder  R/O Substance Induced Depressive D/O vs Major Depressive Disorder  Suicidal Ideation  Alcohol Use Disorder, Severe    38 year old male with history of alcohol use disorder currently voicing suicidal ideation. Patient has been placed on legal hold as he does not present future oriented and states he cannot find a reason to continue living. He has a plan to overdose on drugs and alcohol. He presents with depressed mood, although patient indicates periods of sobriety with continued depression, alcohol use has worsened mood and may be primarily contributing to mood symptoms. It is unclear at this time due to recent substance use. Patient would therefore benefit from rehabilitation facility for improvement of mood and safety.       PLAN:  -Increase Prozac 30mg daily for depressed mood   -Start Hydroxyzine 25mg q6h as needed for anxiety   -Patient would benefit from rehabilitation as substance  use may be contributing to worsening patient's mood or may be primary cause of mood symptoms    Legal status: On Hold, extended  Will Follow   Thank you for the consult.

## 2018-11-22 NOTE — PROGRESS NOTES
Pt was very anxious and restless at the beginning of shift. Ativan was given per the CIWA score. Pt was able to sleep for awhile after his night time medications. Upon waking up pt was unable to fall back asleep. Pt requested for more sleeping aid medication. Pt does not have anymore PRN medication. CIWA was rechecked and the pt was given more Ativan. Pt seems to be less anxious and relaxing in bed. Will continue to monitor.

## 2018-11-22 NOTE — PROGRESS NOTES
Internal Medicine Interval Note  Note Author: Flaco Ren M.D.     Name Jef Spence       1980   Age/Sex 38 y.o. male   MRN [id number]   Code Status Full     After 5PM or if no immediate response to page, please call for cross-coverage  Attending/Team: Isaiah Lang See Patient List for primary contact information  Call [phone] to page    1st Call - Day Intern (R1):   Dr. Ren 2nd Call - Day Sr. Resident (R2/R3):   Dr. Betancourt         Reason for interval visit  (Principal Problem)   Alcohol Detox/withdrawal      Interval Problem Daily Status Update  (24 hours, problem oriented, brief subjective history, new lab/imaging data pertinent to that problem)     -overnight patient CIWA scores 3-13-4.  With scores for diaphoresis, anxiety, agitation.  No seizure like activity.  Vital signs within normal limits.    -upon morning assessment patient is not acitvely diaphoretic, having hallucinations, n/v.  Mild B/l tremors.  Denies active SI or HI.    -he is homeless, still does not want to go to group home despite talking to social work multiple times.  Will have SW try to talk to patient again.    -Later assessment - admits possible suicidal ideation, with plan with drugs and alcohol.  Psychiatry consulted.      Review of Systems   Constitutional: Negative for chills and fever.   Respiratory: Negative for cough and shortness of breath.    Cardiovascular: Negative for chest pain and palpitations.   Gastrointestinal: Negative for abdominal pain, blood in stool and vomiting.   Genitourinary: Negative for dysuria.   Neurological: Positive for tremors. Negative for dizziness.   Psychiatric/Behavioral: Positive for suicidal ideas. The patient is nervous/anxious.        Disposition/Barriers to discharge:   Home, with resources for rehab  No anticipated barriers to discharge    Consultants/Specialty  none  PCP: No primary care provider on file.      Quality Measures  Quality-Core Measures   Reviewed items::  Labs  reviewed and Medications reviewed  Johnson catheter::  No Johnson  DVT prophylaxis pharmacological::  Enoxaparin (Lovenox)          Physical Exam       Vitals:    11/21/18 1832 11/21/18 1915 11/22/18 0310 11/22/18 0810   BP: 135/101 121/90 110/64 119/85   Pulse: 97 84 64 74   Resp:  18 18 16   Temp:  37.1 °C (98.7 °F) 36.4 °C (97.6 °F) 36.4 °C (97.5 °F)   TempSrc:  Temporal Temporal Temporal   SpO2:  95% 94% 95%   Weight:       Height:         Body mass index is 27.02 kg/m².    Oxygen Therapy:  Pulse Oximetry: 95 %, O2 (LPM): 0, O2 Delivery: None (Room Air)    Physical Exam   Constitutional: He is oriented to person, place, and time and well-developed, well-nourished, and in no distress.   HENT:   Head: Normocephalic and atraumatic.   Eyes: Pupils are equal, round, and reactive to light. EOM are normal.   Neck: Neck supple. No thyromegaly present.   Cardiovascular: Normal rate, regular rhythm and normal heart sounds.  Exam reveals no gallop and no friction rub.    No murmur heard.  Pulmonary/Chest: Effort normal and breath sounds normal. No respiratory distress. He has no wheezes.   Abdominal: Soft. Bowel sounds are normal. He exhibits no distension.   Musculoskeletal: Normal range of motion. He exhibits no edema, tenderness or deformity.   Neurological: He is alert and oriented to person, place, and time.   Skin: Skin is warm and dry.   Psychiatric: Affect normal.         Assessment/Plan     * Alcohol abuse- (present on admission)   Assessment & Plan    Patient presented requesting detox, no h/o DTs seizures but drinks 2-3 pints daily of whiskey.  Presented to ED immediately after drinking claiming wanting to detox.  Etoh level 0.29 on admit  Denies any other drug use  Attempted to quit multiple times, requesting assistance  Denies suicidal ideation    Plan:   -CIWA protocol, continuous pulse ox  -scheduled clonidine for hyper-adrenergic symptoms  -valproate for further sedation and decrease benzo use  -continue  "gabapentin and fluoxetine at home doses to avoid CNS withdrawal hyperactivity  -po thiamine/folate/MV     Suicidal ideations   Assessment & Plan    -previously stated SI to EDP.  Denied for first couple days in hospital  -now admits to having thoughts of SI, and that they have been on and off for 1 year.  -When asked about a plan, patient states he would \"go out with a bang......sell his house in [facility]1980 lots of drugs and alocohol\"  -questionable malingering given homelessness and long duration of SI  -consult psychiatry to assess Suicidal ideation      Anxiety   Assessment & Plan    Continue home prozac     Insomnia- (present on admission)   Assessment & Plan    Continue home trazodone     Arthritis- (present on admission)   Assessment & Plan    Continue home diclofenac and gabapentin     Asthma   Assessment & Plan    Albuterol prn            "

## 2018-11-22 NOTE — ASSESSMENT & PLAN NOTE
-thoughts of SI, and that they have been on and off for 1 year.  -was planing to commit suicide with drugs and alcohol  -Was on legal hold, discontinued by psychiatry 11/28/2018  -Currently denies suicidal and homicidal ideation.  -psychiatry is following

## 2018-11-23 PROBLEM — F41.8 DEPRESSION WITH ANXIETY: Status: ACTIVE | Noted: 2018-11-20

## 2018-11-23 PROCEDURE — 700111 HCHG RX REV CODE 636 W/ 250 OVERRIDE (IP): Performed by: STUDENT IN AN ORGANIZED HEALTH CARE EDUCATION/TRAINING PROGRAM

## 2018-11-23 PROCEDURE — A9270 NON-COVERED ITEM OR SERVICE: HCPCS | Performed by: STUDENT IN AN ORGANIZED HEALTH CARE EDUCATION/TRAINING PROGRAM

## 2018-11-23 PROCEDURE — 700102 HCHG RX REV CODE 250 W/ 637 OVERRIDE(OP): Performed by: STUDENT IN AN ORGANIZED HEALTH CARE EDUCATION/TRAINING PROGRAM

## 2018-11-23 PROCEDURE — 770001 HCHG ROOM/CARE - MED/SURG/GYN PRIV*

## 2018-11-23 PROCEDURE — 99233 SBSQ HOSP IP/OBS HIGH 50: CPT | Mod: GC | Performed by: INTERNAL MEDICINE

## 2018-11-23 RX ORDER — LORAZEPAM 2 MG/ML
1 CONCENTRATE ORAL
Status: DISCONTINUED | OUTPATIENT
Start: 2018-11-23 | End: 2018-11-29 | Stop reason: HOSPADM

## 2018-11-23 RX ADMIN — THIAMINE HCL TAB 100 MG 100 MG: 100 TAB at 05:36

## 2018-11-23 RX ADMIN — VALPROIC ACID 250 MG: 250 SOLUTION ORAL at 08:19

## 2018-11-23 RX ADMIN — HYDROXYZINE PAMOATE 25 MG: 25 CAPSULE ORAL at 22:37

## 2018-11-23 RX ADMIN — CLONIDINE HYDROCHLORIDE 0.1 MG: 0.1 TABLET ORAL at 05:36

## 2018-11-23 RX ADMIN — FOLIC ACID 1 MG: 1 TABLET ORAL at 05:36

## 2018-11-23 RX ADMIN — ENOXAPARIN SODIUM 40 MG: 100 INJECTION SUBCUTANEOUS at 05:35

## 2018-11-23 RX ADMIN — HYDROXYZINE PAMOATE 25 MG: 25 CAPSULE ORAL at 14:50

## 2018-11-23 RX ADMIN — FLUOXETINE 30 MG: 10 CAPSULE ORAL at 05:36

## 2018-11-23 RX ADMIN — GABAPENTIN 300 MG: 300 CAPSULE ORAL at 05:36

## 2018-11-23 RX ADMIN — NICOTINE POLACRILEX 2 MG: 2 GUM, CHEWING BUCCAL at 20:42

## 2018-11-23 RX ADMIN — TRAZODONE HYDROCHLORIDE 150 MG: 150 TABLET ORAL at 22:38

## 2018-11-23 RX ADMIN — NICOTINE POLACRILEX 2 MG: 2 GUM, CHEWING BUCCAL at 14:50

## 2018-11-23 RX ADMIN — GABAPENTIN 300 MG: 300 CAPSULE ORAL at 17:51

## 2018-11-23 RX ADMIN — THERA TABS 1 TABLET: TAB at 05:36

## 2018-11-23 RX ADMIN — HYDROXYZINE PAMOATE 25 MG: 25 CAPSULE ORAL at 08:24

## 2018-11-23 RX ADMIN — VALPROIC ACID 250 MG: 250 SOLUTION ORAL at 17:59

## 2018-11-23 RX ADMIN — STANDARDIZED SENNA CONCENTRATE AND DOCUSATE SODIUM 2 TABLET: 8.6; 5 TABLET, FILM COATED ORAL at 05:36

## 2018-11-23 RX ADMIN — GABAPENTIN 300 MG: 300 CAPSULE ORAL at 11:54

## 2018-11-23 ASSESSMENT — ENCOUNTER SYMPTOMS
SHORTNESS OF BREATH: 0
DIZZINESS: 0
PALPITATIONS: 0
CHILLS: 0
NERVOUS/ANXIOUS: 1
VOMITING: 0
ABDOMINAL PAIN: 0
COUGH: 0
TREMORS: 1
FEVER: 0
BLOOD IN STOOL: 0

## 2018-11-23 ASSESSMENT — LIFESTYLE VARIABLES
PAROXYSMAL SWEATS: NO SWEAT VISIBLE
HEADACHE, FULLNESS IN HEAD: VERY MILD
PAROXYSMAL SWEATS: BARELY PERCEPTIBLE SWEATING. PALMS MOIST
NAUSEA AND VOMITING: NO NAUSEA AND NO VOMITING
NAUSEA AND VOMITING: NO NAUSEA AND NO VOMITING
AUDITORY DISTURBANCES: NOT PRESENT
TREMOR: NO TREMOR
PAROXYSMAL SWEATS: NO SWEAT VISIBLE
PAROXYSMAL SWEATS: NO SWEAT VISIBLE
AGITATION: NORMAL ACTIVITY
ANXIETY: MILDLY ANXIOUS
TREMOR: NO TREMOR
ANXIETY: NO ANXIETY (AT EASE)
HEADACHE, FULLNESS IN HEAD: VERY MILD
NAUSEA AND VOMITING: NO NAUSEA AND NO VOMITING
VISUAL DISTURBANCES: NOT PRESENT
PAROXYSMAL SWEATS: NO SWEAT VISIBLE
ORIENTATION AND CLOUDING OF SENSORIUM: ORIENTED AND CAN DO SERIAL ADDITIONS
AGITATION: NORMAL ACTIVITY
HEADACHE, FULLNESS IN HEAD: NOT PRESENT
AUDITORY DISTURBANCES: NOT PRESENT
ANXIETY: NO ANXIETY (AT EASE)
TOTAL SCORE: 1
ORIENTATION AND CLOUDING OF SENSORIUM: ORIENTED AND CAN DO SERIAL ADDITIONS
HEADACHE, FULLNESS IN HEAD: NOT PRESENT
NAUSEA AND VOMITING: NO NAUSEA AND NO VOMITING
ANXIETY: NO ANXIETY (AT EASE)
AUDITORY DISTURBANCES: NOT PRESENT
AUDITORY DISTURBANCES: NOT PRESENT
TOTAL SCORE: 1
VISUAL DISTURBANCES: NOT PRESENT
ANXIETY: NO ANXIETY (AT EASE)
AGITATION: NORMAL ACTIVITY
ORIENTATION AND CLOUDING OF SENSORIUM: ORIENTED AND CAN DO SERIAL ADDITIONS
VISUAL DISTURBANCES: NOT PRESENT
NAUSEA AND VOMITING: NO NAUSEA AND NO VOMITING
HEADACHE, FULLNESS IN HEAD: NOT PRESENT
HEADACHE, FULLNESS IN HEAD: NOT PRESENT
VISUAL DISTURBANCES: NOT PRESENT
TREMOR: NO TREMOR
ORIENTATION AND CLOUDING OF SENSORIUM: ORIENTED AND CAN DO SERIAL ADDITIONS
TREMOR: NO TREMOR
AGITATION: NORMAL ACTIVITY
ORIENTATION AND CLOUDING OF SENSORIUM: ORIENTED AND CAN DO SERIAL ADDITIONS
AGITATION: NORMAL ACTIVITY
VISUAL DISTURBANCES: NOT PRESENT
TREMOR: NO TREMOR
TOTAL SCORE: 0
VISUAL DISTURBANCES: NOT PRESENT
NAUSEA AND VOMITING: NO NAUSEA AND NO VOMITING
AGITATION: NORMAL ACTIVITY
TOTAL SCORE: 1
AUDITORY DISTURBANCES: NOT PRESENT
TOTAL SCORE: 0
ORIENTATION AND CLOUDING OF SENSORIUM: ORIENTED AND CAN DO SERIAL ADDITIONS
TREMOR: NO TREMOR
AUDITORY DISTURBANCES: NOT PRESENT
ANXIETY: NO ANXIETY (AT EASE)
TOTAL SCORE: 1
PAROXYSMAL SWEATS: NO SWEAT VISIBLE

## 2018-11-23 ASSESSMENT — PAIN SCALES - GENERAL: PAINLEVEL_OUTOF10: 0

## 2018-11-23 NOTE — PROGRESS NOTES
Internal Medicine Interval Note  Note Author: Flaco Ren M.D.     Name Jef Spence       1980   Age/Sex 38 y.o. male   MRN [id number]   Code Status Full     After 5PM or if no immediate response to page, please call for cross-coverage  Attending/Team: Isaiah Lang See Patient List for primary contact information  Call [phone] to page    1st Call - Day Intern (R1):   Dr. Ren 2nd Call - Day Sr. Resident (R2/R3):   Dr. Betancourt         Reason for interval visit  (Principal Problem)   Alcohol Detox/withdrawal      Interval Problem Daily Status Update  (24 hours, problem oriented, brief subjective history, new lab/imaging data pertinent to that problem)     -Psychiatry has seen the patient.  Assessed with depressive disorder with suicidal ideation, alcohol use disorder.  Increase Prozac to 30 mg daily, started hydroxyzine 25 mg every 6 hours for anxiety.  States patient will benefit from rehabilitation of substance use may be contributing to worsening patient's mood.  Legal hold continued    -Patient assessed at bedside today.  Distraught.  Some suicidal ideations, especially if he were to discharge.  Patient is distressed that ancillary staff have not helped him secure a secure location for him to stay at upon discharge.  Would like to speak to someone who could put him in the right direction.      Review of Systems   Constitutional: Negative for chills and fever.   Respiratory: Negative for cough and shortness of breath.    Cardiovascular: Negative for chest pain and palpitations.   Gastrointestinal: Negative for abdominal pain, blood in stool and vomiting.   Genitourinary: Negative for dysuria.   Neurological: Positive for tremors. Negative for dizziness.   Psychiatric/Behavioral: Positive for suicidal ideas. The patient is nervous/anxious.        Disposition/Barriers to discharge:   Uncertain - mental health facility vs home with rehab  [place] suicidal  ideation    Consultants/Specialty  none  PCP: No primary care provider on file.      Quality Measures  Quality-Core Measures   Reviewed items::  Labs reviewed and Medications reviewed  Johnson catheter::  No Johnson  DVT prophylaxis pharmacological::  Enoxaparin (Lovenox)          Physical Exam       Vitals:    11/22/18 1510 11/22/18 1600 11/22/18 1900 11/23/18 0827   BP: 131/92  112/90 103/80   Pulse: (!) 105 79 82 63   Resp: 18 16 18   Temp: 36.6 °C (97.9 °F)  36.9 °C (98.4 °F) 36.3 °C (97.3 °F)   TempSrc: Temporal  Temporal Temporal   SpO2: 95%  97% 99%   Weight:       Height:         Body mass index is 27.02 kg/m².    Oxygen Therapy:  Pulse Oximetry: 99 %, O2 (LPM): 0, O2 Delivery: None (Room Air)    Physical Exam   Constitutional: He is oriented to person, place, and time and well-developed, well-nourished, and in no distress.   HENT:   Head: Normocephalic and atraumatic.   Eyes: Pupils are equal, round, and reactive to light. EOM are normal.   Neck: Neck supple. No thyromegaly present.   Cardiovascular: Normal rate, regular rhythm and normal heart sounds.  Exam reveals no gallop and no friction rub.    No murmur heard.  Pulmonary/Chest: Effort normal and breath sounds normal. No respiratory distress. He has no wheezes.   Abdominal: Soft. Bowel sounds are normal. He exhibits no distension.   Musculoskeletal: Normal range of motion. He exhibits no edema, tenderness or deformity.   Neurological: He is alert and oriented to person, place, and time.   Skin: Skin is warm and dry.   Psychiatric: Affect normal.         Assessment/Plan     * Alcohol abuse- (present on admission)   Assessment & Plan    Patient presented requesting detox, no h/o DTs seizures but drinks 2-3 pints daily of whiskey.  Presented to ED immediately after drinking claiming wanting to detox.  Etoh level 0.29 on admit  Denies any other drug use  Attempted to quit multiple times, requesting assistance    Plan:   -LISETHWA protocol, benzo's  discontinued  -scheduled clonidine for hyper-adrenergic symptoms  -valproate for further sedation and decrease benzo use  -continue gabapentin and fluoxetine to avoid CNS withdrawal hyperactivity  -po thiamine/folate/MV     Suicidal ideations   Assessment & Plan    -thoughts of SI, and that they have been on and off for 1 year.  -plan to commit suicide with drugs and alcohol  -legal hold  -psychiatry is following         Depression with anxiety   Assessment & Plan      -increased fluoxetine dosage to 30mg per day from home dose 10 mg  -hydroxyzine 25mg q6hrs for anxiety     Insomnia- (present on admission)   Assessment & Plan    Continue home trazodone     Arthritis- (present on admission)   Assessment & Plan    Continue home diclofenac and gabapentin     Asthma   Assessment & Plan    Albuterol prn

## 2018-11-23 NOTE — CARE PLAN
Problem: Safety  Goal: Will remain free from injury  Outcome: PROGRESSING AS EXPECTED  Will utilize bed alarms if necesarry, non skid socks, remind patient to call for assistance if needed      Problem: Psychosocial Needs:  Goal: Level of anxiety will decrease  Outcome: PROGRESSING AS EXPECTED  Reduce external stimuli, will limit interaction with patient

## 2018-11-23 NOTE — PROGRESS NOTES
Pt is A&Ox4 this AM and cooperative with assessment. CIWA was zero and neuro check negative. Pt showered with door open. Sitter continues at bedside. Per most recent psych note, pt allowed to have belongings, personal clothing, phone, and call light. Pt requesting to shave and cut toenails, provided both with sitter at bedside to monitor. All needs met. RN will continue to monitor.

## 2018-11-23 NOTE — PROGRESS NOTES
Pt had uneventful night. CIWA still under 5, anxiety medication only given once. Pt refused nicotine patch this am. Will continue to monitor. Sitter present at bedside.

## 2018-11-23 NOTE — CARE PLAN
Problem: Safety  Goal: Will remain free from falls  Outcome: PROGRESSING AS EXPECTED  Pt ambulates independently with a steady gait.    Problem: Discharge Barriers/Planning  Goal: Patient's continuum of care needs will be met  Outcome: PROGRESSING AS EXPECTED  Pt awaiting DC of legal hold/

## 2018-11-23 NOTE — DISCHARGE PLANNING
Care Transition Team Assessment  Patient is wanting housing assistance, does not want to go to the shelter.  As he feels his will drink again.  Patient will be provided local resources to call inpatient alcohol/drug programs.  Patient was living with his parents in [place] prior to moving to [place].  While he can go back to their home, it is only for a short period of time before they will want him to leave.     Information Source  Orientation : Oriented x 4  Information Given By: Patient  Informant's Name: Jef    Readmission Evaluation  Is this a readmission?: No    Elopement Risk  Legal Hold: No  Ambulatory or Self Mobile in Wheelchair: Yes  Disoriented: No  Psychiatric Symptoms: None  History of Wandering: No  Elopement this Admit: No  Vocalizing Wanting to Leave: No  Displays Behaviors, Body Language Wanting to Leave: No-Not at Risk for Elopement  Time of Legal Hold: 1100  Date of Legal Hold: 11/22/18  Elopement Risk: Not at Risk for Elopement    Interdisciplinary Discharge Planning  Patient or legal guardian wants to designate a caregiver (see row info): No    Discharge Preparedness  What is your plan after discharge?: Other (comment) (patient is requesting housing assistance)  What are your discharge supports?: Parent  Prior Functional Level: Ambulatory  Difficulity with ADLs: None  Difficulity with IADLs: None    Functional Assesment  Prior Functional Level: Ambulatory    Finances  Financial Barriers to Discharge: Yes  Average Monthly Income:  (patient is not working , no income)  Prescription Coverage: Yes    Vision / Hearing Impairment  Vision Impairment : No  Hearing Impairment : No    Values / Beliefs / Concerns  Values / Beliefs Concerns : No         Domestic Abuse  Have you ever been the victim of abuse or violence?: No  Physical Abuse or Sexual Abuse: No  Verbal Abuse or Emotional Abuse: No  Possible Abuse Reported to:: Not Applicable    Psychological Assessment  History of Substance Abuse: Alcohol,  Prescription opioids  Date Last Used - Alcohol:  (day of admit)  Date Last Used - Prescription Opioids:  (three weeks ago)  Substance Abuse Comments:  (patient is not current using)  Non-compliant with Treatment: No  Newly Diagnosed Illness: No    Discharge Risks or Barriers  Discharge risks or barriers?: No PCP, Substance abuse, Lives alone, no community support, Homeless / couch surfing  Patient risk factors: Homeless, Lack of outside supports, Lives alone and no community support, No PCP, Substance abuse    Anticipated Discharge Information  Anticipated discharge disposition: Shelter  Discharge Address:  ([facility] [address])
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
## 2018-11-24 PROCEDURE — 700111 HCHG RX REV CODE 636 W/ 250 OVERRIDE (IP): Performed by: STUDENT IN AN ORGANIZED HEALTH CARE EDUCATION/TRAINING PROGRAM

## 2018-11-24 PROCEDURE — 99232 SBSQ HOSP IP/OBS MODERATE 35: CPT | Mod: GC | Performed by: INTERNAL MEDICINE

## 2018-11-24 PROCEDURE — A9270 NON-COVERED ITEM OR SERVICE: HCPCS | Performed by: STUDENT IN AN ORGANIZED HEALTH CARE EDUCATION/TRAINING PROGRAM

## 2018-11-24 PROCEDURE — 700102 HCHG RX REV CODE 250 W/ 637 OVERRIDE(OP): Performed by: STUDENT IN AN ORGANIZED HEALTH CARE EDUCATION/TRAINING PROGRAM

## 2018-11-24 PROCEDURE — 770001 HCHG ROOM/CARE - MED/SURG/GYN PRIV*

## 2018-11-24 RX ADMIN — CLONIDINE HYDROCHLORIDE 0.1 MG: 0.1 TABLET ORAL at 05:53

## 2018-11-24 RX ADMIN — THIAMINE HCL TAB 100 MG 100 MG: 100 TAB at 05:53

## 2018-11-24 RX ADMIN — HYDROXYZINE PAMOATE 25 MG: 25 CAPSULE ORAL at 23:09

## 2018-11-24 RX ADMIN — VALPROIC ACID 250 MG: 250 SOLUTION ORAL at 05:56

## 2018-11-24 RX ADMIN — THERA TABS 1 TABLET: TAB at 05:53

## 2018-11-24 RX ADMIN — FOLIC ACID 1 MG: 1 TABLET ORAL at 05:53

## 2018-11-24 RX ADMIN — HYDROXYZINE PAMOATE 25 MG: 25 CAPSULE ORAL at 13:23

## 2018-11-24 RX ADMIN — ENOXAPARIN SODIUM 40 MG: 100 INJECTION SUBCUTANEOUS at 05:54

## 2018-11-24 RX ADMIN — ALBUTEROL SULFATE 2 PUFF: 90 AEROSOL, METERED RESPIRATORY (INHALATION) at 22:10

## 2018-11-24 RX ADMIN — GABAPENTIN 300 MG: 300 CAPSULE ORAL at 13:18

## 2018-11-24 RX ADMIN — GABAPENTIN 300 MG: 300 CAPSULE ORAL at 17:31

## 2018-11-24 RX ADMIN — FLUOXETINE 30 MG: 10 CAPSULE ORAL at 05:53

## 2018-11-24 RX ADMIN — GABAPENTIN 300 MG: 300 CAPSULE ORAL at 05:53

## 2018-11-24 RX ADMIN — STANDARDIZED SENNA CONCENTRATE AND DOCUSATE SODIUM 2 TABLET: 8.6; 5 TABLET, FILM COATED ORAL at 05:53

## 2018-11-24 RX ADMIN — NICOTINE POLACRILEX 2 MG: 2 GUM, CHEWING BUCCAL at 19:46

## 2018-11-24 RX ADMIN — HYDROXYZINE PAMOATE 25 MG: 25 CAPSULE ORAL at 05:56

## 2018-11-24 RX ADMIN — ALBUTEROL SULFATE 2 PUFF: 90 AEROSOL, METERED RESPIRATORY (INHALATION) at 08:20

## 2018-11-24 RX ADMIN — TRAZODONE HYDROCHLORIDE 150 MG: 150 TABLET ORAL at 23:10

## 2018-11-24 ASSESSMENT — LIFESTYLE VARIABLES
TREMOR: NO TREMOR
PAROXYSMAL SWEATS: NO SWEAT VISIBLE
VISUAL DISTURBANCES: NOT PRESENT
VISUAL DISTURBANCES: NOT PRESENT
PAROXYSMAL SWEATS: NO SWEAT VISIBLE
PAROXYSMAL SWEATS: NO SWEAT VISIBLE
VISUAL DISTURBANCES: NOT PRESENT
TREMOR: NO TREMOR
AUDITORY DISTURBANCES: NOT PRESENT
HEADACHE, FULLNESS IN HEAD: NOT PRESENT
NAUSEA AND VOMITING: NO NAUSEA AND NO VOMITING
AGITATION: NORMAL ACTIVITY
TOTAL SCORE: 0
NAUSEA AND VOMITING: NO NAUSEA AND NO VOMITING
NAUSEA AND VOMITING: NO NAUSEA AND NO VOMITING
HEADACHE, FULLNESS IN HEAD: NOT PRESENT
ANXIETY: NO ANXIETY (AT EASE)
ORIENTATION AND CLOUDING OF SENSORIUM: ORIENTED AND CAN DO SERIAL ADDITIONS
TOTAL SCORE: 0
AGITATION: NORMAL ACTIVITY
HEADACHE, FULLNESS IN HEAD: NOT PRESENT
TOTAL SCORE: 0
AGITATION: NORMAL ACTIVITY
NAUSEA AND VOMITING: NO NAUSEA AND NO VOMITING
AUDITORY DISTURBANCES: NOT PRESENT
ANXIETY: NO ANXIETY (AT EASE)
AUDITORY DISTURBANCES: NOT PRESENT
ORIENTATION AND CLOUDING OF SENSORIUM: ORIENTED AND CAN DO SERIAL ADDITIONS
ORIENTATION AND CLOUDING OF SENSORIUM: ORIENTED AND CAN DO SERIAL ADDITIONS
TREMOR: NO TREMOR
ANXIETY: NO ANXIETY (AT EASE)
TREMOR: NO TREMOR
VISUAL DISTURBANCES: NOT PRESENT
AUDITORY DISTURBANCES: NOT PRESENT
PAROXYSMAL SWEATS: NO SWEAT VISIBLE
TOTAL SCORE: 0
AGITATION: NORMAL ACTIVITY
ANXIETY: NO ANXIETY (AT EASE)
ORIENTATION AND CLOUDING OF SENSORIUM: ORIENTED AND CAN DO SERIAL ADDITIONS
HEADACHE, FULLNESS IN HEAD: NOT PRESENT

## 2018-11-24 ASSESSMENT — ENCOUNTER SYMPTOMS
NERVOUS/ANXIOUS: 1
SHORTNESS OF BREATH: 0
FEVER: 0
BLOOD IN STOOL: 0
COUGH: 0
TREMORS: 0
VOMITING: 0
DIZZINESS: 0
ABDOMINAL PAIN: 0
PALPITATIONS: 0
CHILLS: 0

## 2018-11-24 ASSESSMENT — PAIN SCALES - GENERAL
PAINLEVEL_OUTOF10: 0
PAINLEVEL_OUTOF10: 0

## 2018-11-24 NOTE — CARE PLAN
Problem: Knowledge Deficit  Goal: Knowledge of disease process/condition, treatment plan, diagnostic tests, and medications will improve    Intervention: Assess knowledge level of disease process/condition, treatment plan, diagnostic tests, and medications  Patient educated on plan of care,discharge and placement to help him with alcohol cessation.

## 2018-11-24 NOTE — PROGRESS NOTES
Internal Medicine Interval Note  Note Author: Flaco Ren M.D.     Name Jef Spence       1980   Age/Sex 38 y.o. male   MRN [id number]   Code Status Full     After 5PM or if no immediate response to page, please call for cross-coverage  Attending/Team: Isaiah Lang See Patient List for primary contact information  Call [phone] to page    1st Call - Day Intern (R1):   Dr. Ren 2nd Call - Day Sr. Resident (R2/R3):   Dr. Betancourt         Reason for interval visit  (Principal Problem)   Alcohol Detox/withdrawal      Interval Problem Daily Status Update  (24 hours, problem oriented, brief subjective history, new lab/imaging data pertinent to that problem)     -Alcohol withdrawal-resolved.  No longer on [place] protocol, valproic acid and clonidine have been discontinued.  The patient is medically cleared for discharge, pending psychiatric evaluation for suicidal symptoms and legal hold.  Per psychiatry the patient is on Prozac 30 mg daily and hydroxyzine 25 mg every 6 for anxiety.    -Patient assessed at bedside today.  Happier today as we will have explained to him the process of applying to rehab facilities, the patient is encouraged to call which he will begin doing on Monday.    Review of Systems   Constitutional: Negative for chills and fever.   Respiratory: Negative for cough and shortness of breath.    Cardiovascular: Negative for chest pain and palpitations.   Gastrointestinal: Negative for abdominal pain, blood in stool and vomiting.   Genitourinary: Negative for dysuria.   Neurological: Negative for dizziness and tremors.   Psychiatric/Behavioral: Positive for suicidal ideas. The patient is nervous/anxious.        Disposition/Barriers to discharge:   Uncertain - mental health facility vs home with rehab  [place]rieres suicidal ideation    Consultants/Specialty  none  PCP: No primary care provider on file.      Quality Measures  Quality-Core Measures   Reviewed items::  Labs reviewed and  Medications reviewed  Johnson catheter::  No Johnson  DVT prophylaxis pharmacological::  Enoxaparin (Lovenox)          Physical Exam       Vitals:    11/23/18 0827 11/23/18 1154 11/23/18 1515 11/23/18 1922   BP: 103/80 (!) 96/42 146/86 113/83   Pulse: 63  68 67   Resp: 18  18 16   Temp: 36.3 °C (97.3 °F)  36.3 °C (97.3 °F) 37.2 °C (98.9 °F)   TempSrc: Temporal  Temporal Temporal   SpO2: 99%  98% 95%   Weight:       Height:         Body mass index is 27.02 kg/m².    Oxygen Therapy:  Pulse Oximetry: 95 %, O2 (LPM): 0, O2 Delivery: None (Room Air)    Physical Exam   Constitutional: He is oriented to person, place, and time and well-developed, well-nourished, and in no distress.   HENT:   Head: Normocephalic and atraumatic.   Eyes: Pupils are equal, round, and reactive to light. EOM are normal.   Neck: Neck supple.   Cardiovascular: Normal rate, regular rhythm and normal heart sounds.  Exam reveals no gallop and no friction rub.    No murmur heard.  Pulmonary/Chest: Effort normal and breath sounds normal. No respiratory distress. He has no wheezes.   Abdominal: Soft. Bowel sounds are normal. He exhibits no distension.   Musculoskeletal: Normal range of motion. He exhibits no edema or tenderness.   Neurological: He is alert and oriented to person, place, and time.   Skin: Skin is warm and dry.   Psychiatric: Affect normal.         Assessment/Plan     * Alcohol abuse- (present on admission)   Assessment & Plan    -Presented to ED immediately after drinking claiming wanting to detox.  -Etoh level 0.29 on admit  -CIWA protocol discontinued -no seizures noted  -Attempted to quit multiple times, requesting assistance    -Continue oral thiamine, folate, multivitamin       Suicidal ideations   Assessment & Plan    -thoughts of SI, and that they have been on and off for 1 year.  -plan to commit suicide with drugs and alcohol  -legal hold  -psychiatry is following         Depression with anxiety   Assessment & Plan      -increased  fluoxetine dosage to 30mg per day from home dose 10 mg  -hydroxyzine 25mg q6hrs for anxiety     Insomnia- (present on admission)   Assessment & Plan    Continue home trazodone     Arthritis- (present on admission)   Assessment & Plan    Continue home diclofenac and gabapentin     Asthma   Assessment & Plan    Albuterol prn

## 2018-11-24 NOTE — DISCHARGE PLANNING
Anticipated Discharge Disposition: Mental Health Facility    Action: Patient is now medically clear to d/c to a mental health facility  Faxed updated Legal Hold to [facility] and to [facility] to submit referrals  Emailed [facility] referral to [facility]    Barriers to Discharge: acceptance    Plan: follow up with [facility] for housing

## 2018-11-24 NOTE — DISCHARGE PLANNING
Received Legal 2000.  Referral sent to [facility], and [facility] with copy of Legal 2000 attached.  Per [facility] they do not accept any [facility].

## 2018-11-24 NOTE — CARE PLAN
Problem: Psychosocial Needs:  Goal: Level of anxiety will decrease    Intervention: Collaborate with Interdisciplinary Team including Psychologist/Behavioral Health Team  Patient educated on plan of care, being cleared by medical team and now having to get placement.

## 2018-11-25 PROCEDURE — 700102 HCHG RX REV CODE 250 W/ 637 OVERRIDE(OP): Performed by: STUDENT IN AN ORGANIZED HEALTH CARE EDUCATION/TRAINING PROGRAM

## 2018-11-25 PROCEDURE — 770001 HCHG ROOM/CARE - MED/SURG/GYN PRIV*

## 2018-11-25 PROCEDURE — A9270 NON-COVERED ITEM OR SERVICE: HCPCS | Performed by: STUDENT IN AN ORGANIZED HEALTH CARE EDUCATION/TRAINING PROGRAM

## 2018-11-25 PROCEDURE — 700111 HCHG RX REV CODE 636 W/ 250 OVERRIDE (IP): Performed by: STUDENT IN AN ORGANIZED HEALTH CARE EDUCATION/TRAINING PROGRAM

## 2018-11-25 PROCEDURE — 99232 SBSQ HOSP IP/OBS MODERATE 35: CPT | Mod: GC | Performed by: INTERNAL MEDICINE

## 2018-11-25 RX ADMIN — ALBUTEROL SULFATE 2 PUFF: 90 AEROSOL, METERED RESPIRATORY (INHALATION) at 12:24

## 2018-11-25 RX ADMIN — POLYETHYLENE GLYCOL 3350 1 PACKET: 17 POWDER, FOR SOLUTION ORAL at 12:24

## 2018-11-25 RX ADMIN — FLUOXETINE 30 MG: 10 CAPSULE ORAL at 07:15

## 2018-11-25 RX ADMIN — NICOTINE POLACRILEX 2 MG: 2 GUM, CHEWING BUCCAL at 09:14

## 2018-11-25 RX ADMIN — HYDROXYZINE PAMOATE 25 MG: 25 CAPSULE ORAL at 10:35

## 2018-11-25 RX ADMIN — GABAPENTIN 300 MG: 300 CAPSULE ORAL at 07:15

## 2018-11-25 RX ADMIN — GABAPENTIN 300 MG: 300 CAPSULE ORAL at 12:00

## 2018-11-25 RX ADMIN — HYDROXYZINE PAMOATE 25 MG: 25 CAPSULE ORAL at 16:37

## 2018-11-25 RX ADMIN — GABAPENTIN 300 MG: 300 CAPSULE ORAL at 22:43

## 2018-11-25 RX ADMIN — TRAZODONE HYDROCHLORIDE 150 MG: 150 TABLET ORAL at 22:43

## 2018-11-25 RX ADMIN — NICOTINE POLACRILEX 2 MG: 2 GUM, CHEWING BUCCAL at 19:16

## 2018-11-25 RX ADMIN — ENOXAPARIN SODIUM 40 MG: 100 INJECTION SUBCUTANEOUS at 07:16

## 2018-11-25 ASSESSMENT — ENCOUNTER SYMPTOMS
TREMORS: 0
COUGH: 0
BLOOD IN STOOL: 0
VOMITING: 0
NERVOUS/ANXIOUS: 1
ABDOMINAL PAIN: 0
FEVER: 0
PALPITATIONS: 0
DIZZINESS: 0
CHILLS: 0
SHORTNESS OF BREATH: 0

## 2018-11-25 ASSESSMENT — PAIN SCALES - GENERAL: PAINLEVEL_OUTOF10: 0

## 2018-11-25 NOTE — PROGRESS NOTES
Received report and assumed patient care at 1900.     A&Ox4, no pain reported. Pt appearing anxious and stating that he is ready to no longer be here.  Complaining about bed being uncomfortable. Provided extra pillows for pt comfort.  All other needs met at this time.     Pt is on a legal hold with a sitter at bedside. Patients bed is locked and in lowest position, call light and bedside table are within reach, treaded slipper socks are on, and hourly rounding in place.

## 2018-11-25 NOTE — PROGRESS NOTES
Internal Medicine Interval Note  Note Author: Claudia Palmer M.D.     Name Jef Spence       1980   Age/Sex 38 y.o. male   MRN [id number]   Code Status Full     After 5PM or if no immediate response to page, please call for cross-coverage  Attending/Team: Isaiah Lang See Patient List for primary contact information  Call [phone] to page    1st Call - Day Intern (R1):   Dr. Ren 2nd Call - Day Sr. Resident (R2/R3):   Dr. Betancourt         Reason for interval visit  (Principal Problem)   Alcohol Detox/withdrawal      Interval Problem Daily Status Update  (24 hours, problem oriented, brief subjective history, new lab/imaging data pertinent to that problem)     -No events overnight, patient was resting comfortably on the chair, acknowledged difficulty sleeping due to one-to-one sitting, no used to sleep while someone watching him, when asked about suicidal ideation patient gives vague answers like I do not know what I will do after discharge, no indication for hospitalization from internal medicine aspect, still on a legal hold waiting for psych clearance.    Review of Systems   Constitutional: Negative for chills and fever.   Respiratory: Negative for cough and shortness of breath.    Cardiovascular: Negative for chest pain and palpitations.   Gastrointestinal: Negative for abdominal pain, blood in stool and vomiting.   Genitourinary: Negative for dysuria.   Neurological: Negative for dizziness and tremors.   Psychiatric/Behavioral: Positive for suicidal ideas. The patient is nervous/anxious.        Disposition/Barriers to discharge:   Uncertain - mental health facility vs home with rehab  Barrieres suicidal ideation    Consultants/Specialty  none  PCP: No primary care provider on file.      Quality Measures  Quality-Core Measures   Reviewed items::  Labs reviewed and Medications reviewed  Johnson catheter::  No Johnson  DVT prophylaxis pharmacological::  Enoxaparin  (Lovenox)          Physical Exam       Vitals:    11/24/18 1600 11/24/18 1945 11/25/18 0310 11/25/18 0700   BP: 100/63 122/59 109/74 105/61   Pulse: 95 95 65 82   Resp: 18 16 17 16   Temp: 37.4 °C (99.3 °F) 36.8 °C (98.3 °F) 36.8 °C (98.2 °F) 36.1 °C (97 °F)   TempSrc: Temporal Temporal Temporal Temporal   SpO2: 96% 95% 99% 97%   Weight:       Height:         Body mass index is 27.02 kg/m².    Oxygen Therapy:  Pulse Oximetry: 97 %, O2 (LPM): 0, O2 Delivery: None (Room Air)    Physical Exam   Constitutional: He is oriented to person, place, and time and well-developed, well-nourished, and in no distress.   HENT:   Head: Normocephalic and atraumatic.   Eyes: Pupils are equal, round, and reactive to light. EOM are normal.   Neck: Neck supple.   Cardiovascular: Normal rate, regular rhythm and normal heart sounds.  Exam reveals no gallop and no friction rub.    No murmur heard.  Pulmonary/Chest: Effort normal and breath sounds normal. No respiratory distress. He has no wheezes.   Abdominal: Soft. Bowel sounds are normal. He exhibits no distension.   Musculoskeletal: Normal range of motion. He exhibits no edema or tenderness.   Neurological: He is alert and oriented to person, place, and time.   Skin: Skin is warm and dry.   Psychiatric: Affect normal.         Assessment/Plan     * Alcohol abuse- (present on admission)   Assessment & Plan    -Presented to ED immediately after drinking claiming wanting to detox.  -Etoh level 0.29 on admit  -CIWA protocol discontinued -no seizures noted  -Attempted to quit multiple times, requesting assistance    -Continue oral thiamine, folate, multivitamin       Suicidal ideations   Assessment & Plan    -thoughts of SI, and that they have been on and off for 1 year.  -plan to commit suicide with drugs and alcohol  -legal hold  -psychiatry is following         Depression with anxiety   Assessment & Plan      -increased fluoxetine dosage to 30mg per day from home dose 10 mg  -hydroxyzine  25mg q6hrs for anxiety     Insomnia- (present on admission)   Assessment & Plan    Continue home trazodone     Arthritis- (present on admission)   Assessment & Plan    Continue home diclofenac and gabapentin     Asthma   Assessment & Plan    Albuterol prn

## 2018-11-25 NOTE — CARE PLAN
Problem: Safety  Goal: Will remain free from injury  Outcome: PROGRESSING AS EXPECTED  Patient remains free from falls.

## 2018-11-25 NOTE — PROGRESS NOTES
Patient is alert and oriented. Denied pain and discomfort. Up independently in the room and hallways. Remains a legal hold with sitter at bedside for safety. Safety precautions in place.

## 2018-11-25 NOTE — CARE PLAN
Problem: Safety  Goal: Will remain free from injury  Outcome: PROGRESSING AS EXPECTED  Patient on legal hold for SI. Safety sitter at bedside.     Problem: Infection  Goal: Will remain free from infection  Outcome: PROGRESSING AS EXPECTED  Proper PPE performed going in and out of patient room, and when performing pt care.

## 2018-11-26 PROCEDURE — A9270 NON-COVERED ITEM OR SERVICE: HCPCS | Performed by: STUDENT IN AN ORGANIZED HEALTH CARE EDUCATION/TRAINING PROGRAM

## 2018-11-26 PROCEDURE — 99232 SBSQ HOSP IP/OBS MODERATE 35: CPT | Mod: GC | Performed by: INTERNAL MEDICINE

## 2018-11-26 PROCEDURE — 700102 HCHG RX REV CODE 250 W/ 637 OVERRIDE(OP): Performed by: STUDENT IN AN ORGANIZED HEALTH CARE EDUCATION/TRAINING PROGRAM

## 2018-11-26 PROCEDURE — 770001 HCHG ROOM/CARE - MED/SURG/GYN PRIV*

## 2018-11-26 RX ADMIN — GABAPENTIN 300 MG: 300 CAPSULE ORAL at 17:08

## 2018-11-26 RX ADMIN — HYDROXYZINE PAMOATE 25 MG: 25 CAPSULE ORAL at 18:49

## 2018-11-26 RX ADMIN — FLUOXETINE 30 MG: 10 CAPSULE ORAL at 12:33

## 2018-11-26 RX ADMIN — GABAPENTIN 300 MG: 300 CAPSULE ORAL at 22:44

## 2018-11-26 RX ADMIN — HYDROXYZINE PAMOATE 25 MG: 25 CAPSULE ORAL at 12:34

## 2018-11-26 RX ADMIN — ALBUTEROL SULFATE 2 PUFF: 90 AEROSOL, METERED RESPIRATORY (INHALATION) at 22:45

## 2018-11-26 RX ADMIN — NICOTINE POLACRILEX 2 MG: 2 GUM, CHEWING BUCCAL at 19:29

## 2018-11-26 RX ADMIN — GABAPENTIN 300 MG: 300 CAPSULE ORAL at 12:33

## 2018-11-26 RX ADMIN — HYDROXYZINE PAMOATE 25 MG: 25 CAPSULE ORAL at 00:23

## 2018-11-26 ASSESSMENT — ENCOUNTER SYMPTOMS
TREMORS: 0
NERVOUS/ANXIOUS: 1
CHILLS: 0
PALPITATIONS: 0
BLOOD IN STOOL: 0
SHORTNESS OF BREATH: 0
DIZZINESS: 0
FEVER: 0
ABDOMINAL PAIN: 0
COUGH: 0
VOMITING: 0

## 2018-11-26 NOTE — PROGRESS NOTES
Internal Medicine Interval Note  Note Author: Flaco Ren M.D.     Name Jef Spence       1980   Age/Sex 38 y.o. male   MRN [id number]   Code Status Full     After 5PM or if no immediate response to page, please call for cross-coverage  Attending/Team: Isaiah Lang See Patient List for primary contact information  Call [phone] to page    1st Call - Day Intern (R1):   Dr. Ren 2nd Call - Day Sr. Resident (R2/R3):   Dr. Betancourt         Reason for interval visit  (Principal Problem)   Alcohol Detox/withdrawal      Interval Problem Daily Status Update  (24 hours, problem oriented, brief subjective history, new lab/imaging data pertinent to that problem)     -No acute events overnight.  Patient denies active suicidal ideation or homicidal ideation.  However when prompted, the patient still states that she does not feel safe about where to go upon discharge.  Patient has called some of the outpatient rehab facilities and is in the waiting line.    -Patient having difficulty sleeping at night with a one-to-one sitter.    Review of Systems   Constitutional: Negative for chills and fever.   Respiratory: Negative for cough and shortness of breath.    Cardiovascular: Negative for chest pain and palpitations.   Gastrointestinal: Negative for abdominal pain, blood in stool and vomiting.   Genitourinary: Negative for dysuria.   Neurological: Negative for dizziness and tremors.   Psychiatric/Behavioral: The patient is nervous/anxious.        Disposition/Barriers to discharge:   Uncertain - mental health facility vs home with rehab  [place]rieres suicidal ideation    Consultants/Specialty  none  PCP: No primary care provider on file.      Quality Measures  Quality-Core Measures   Reviewed items::  Labs reviewed and Medications reviewed  Johnson catheter::  No Johnson  DVT prophylaxis pharmacological::  Enoxaparin (Lovenox)          Physical Exam       Vitals:    18 1945 18 0310 18 0700 18 1500    BP: 122/59 109/74 105/61 113/77   Pulse: 95 65 82 85   Resp: 16 17 16 17   Temp: 36.8 °C (98.3 °F) 36.8 °C (98.2 °F) 36.1 °C (97 °F) 36.1 °C (97 °F)   TempSrc: Temporal Temporal Temporal Temporal   SpO2: 95% 99% 97% 94%   Weight:       Height:         Body mass index is 27.02 kg/m².    Oxygen Therapy:  Pulse Oximetry: 94 %, O2 Delivery: None (Room Air)    Physical Exam   Constitutional: He is oriented to person, place, and time and well-developed, well-nourished, and in no distress.   HENT:   Head: Normocephalic and atraumatic.   Eyes: Pupils are equal, round, and reactive to light. EOM are normal.   Neck: Neck supple.   Musculoskeletal: Normal range of motion. He exhibits no edema or tenderness.   Neurological: He is alert and oriented to person, place, and time.   Skin: Skin is dry. No rash noted.   Psychiatric: Affect normal.         Assessment/Plan     * Alcohol abuse- (present on admission)   Assessment & Plan    -Presented to ED immediately after drinking claiming wanting to detox.  -Etoh level 0.29 on admit  -CIWA protocol discontinued -no seizures noted  -Attempted to quit multiple times, requesting assistance    -Continue oral thiamine, folate, multivitamin       Suicidal ideations   Assessment & Plan    -thoughts of SI, and that they have been on and off for 1 year.  -plan to commit suicide with drugs and alcohol  -legal hold  -psychiatry is following         Depression with anxiety   Assessment & Plan      -increased fluoxetine dosage to 30mg per day from home dose 10 mg  -hydroxyzine 25mg q6hrs for anxiety     Insomnia- (present on admission)   Assessment & Plan    Continue home trazodone     Arthritis- (present on admission)   Assessment & Plan    Continue home diclofenac and gabapentin     Asthma   Assessment & Plan    Albuterol prn

## 2018-11-26 NOTE — CARE PLAN
Problem: Safety  Goal: Will remain free from injury  Outcome: PROGRESSING AS EXPECTED  Pt remains free from falls and injury this shift. Pt remains on 1:1 observation with sitter, acts appropriately, has not made any attempts or comments about causing injury to self. Continue to assess for fall/injury risks and intervene as necessary.     Problem: Venous Thromboembolism (VTW)/Deep Vein Thrombosis (DVT) Prevention:  Goal: Patient will participate in Venous Thrombosis (VTE)/Deep Vein Thrombosis (DVT)Prevention Measures  Pt not wearing SCDs, but ambulates frequently in room and halls. Continue to encourage early ambulation and educate on risks for VTE/DVT as well as prevention techniques

## 2018-11-26 NOTE — PROGRESS NOTES
"Assumed care of pt at 1900. Pt sitting in chair with laptop, sitter at bedside, no c/o paint or other discomforts at this time. Pt requested personal hygiene supplies for later tonight. Pt also concerned with staff knowing that he did not state any suicidal ideations to warrant a safety sitter. Pt states that he had said \"If I don't quit drinking soon, I'll end up dead before too long\". No further needs at this time  "

## 2018-11-27 PROCEDURE — 700102 HCHG RX REV CODE 250 W/ 637 OVERRIDE(OP): Performed by: STUDENT IN AN ORGANIZED HEALTH CARE EDUCATION/TRAINING PROGRAM

## 2018-11-27 PROCEDURE — A9270 NON-COVERED ITEM OR SERVICE: HCPCS | Performed by: STUDENT IN AN ORGANIZED HEALTH CARE EDUCATION/TRAINING PROGRAM

## 2018-11-27 PROCEDURE — 770001 HCHG ROOM/CARE - MED/SURG/GYN PRIV*

## 2018-11-27 PROCEDURE — 99232 SBSQ HOSP IP/OBS MODERATE 35: CPT | Mod: GC | Performed by: INTERNAL MEDICINE

## 2018-11-27 RX ORDER — HYDROXYZINE PAMOATE 25 MG/1
25 CAPSULE ORAL EVERY 4 HOURS PRN
Status: DISCONTINUED | OUTPATIENT
Start: 2018-11-27 | End: 2018-11-29 | Stop reason: HOSPADM

## 2018-11-27 RX ORDER — CAPSAICIN 0.025 %
CREAM (GRAM) TOPICAL 3 TIMES DAILY PRN
Status: DISCONTINUED | OUTPATIENT
Start: 2018-11-27 | End: 2018-11-29 | Stop reason: HOSPADM

## 2018-11-27 RX ADMIN — TRAZODONE HYDROCHLORIDE 150 MG: 150 TABLET ORAL at 22:23

## 2018-11-27 RX ADMIN — NICOTINE POLACRILEX 2 MG: 2 GUM, CHEWING BUCCAL at 15:58

## 2018-11-27 RX ADMIN — HYDROXYZINE PAMOATE 25 MG: 25 CAPSULE ORAL at 01:16

## 2018-11-27 RX ADMIN — GABAPENTIN 300 MG: 300 CAPSULE ORAL at 17:45

## 2018-11-27 RX ADMIN — HYDROXYZINE PAMOATE 25 MG: 25 CAPSULE ORAL at 17:45

## 2018-11-27 RX ADMIN — HYDROXYZINE PAMOATE 25 MG: 25 CAPSULE ORAL at 09:31

## 2018-11-27 RX ADMIN — CAPSAICIN: 0.25 CREAM TOPICAL at 15:53

## 2018-11-27 RX ADMIN — NICOTINE POLACRILEX 2 MG: 2 GUM, CHEWING BUCCAL at 20:42

## 2018-11-27 RX ADMIN — HYDROXYZINE PAMOATE 25 MG: 25 CAPSULE ORAL at 22:23

## 2018-11-27 RX ADMIN — GABAPENTIN 300 MG: 300 CAPSULE ORAL at 12:39

## 2018-11-27 RX ADMIN — TRAZODONE HYDROCHLORIDE 150 MG: 150 TABLET ORAL at 01:10

## 2018-11-27 RX ADMIN — GABAPENTIN 300 MG: 300 CAPSULE ORAL at 08:22

## 2018-11-27 RX ADMIN — DICLOFENAC SODIUM 50 MG: 50 TABLET, DELAYED RELEASE ORAL at 01:10

## 2018-11-27 RX ADMIN — FLUOXETINE 30 MG: 10 CAPSULE ORAL at 08:22

## 2018-11-27 ASSESSMENT — ENCOUNTER SYMPTOMS
FEVER: 0
CHILLS: 0
DIZZINESS: 0
PALPITATIONS: 0
VOMITING: 0
BLOOD IN STOOL: 0
NERVOUS/ANXIOUS: 1
TREMORS: 0
SHORTNESS OF BREATH: 0
COUGH: 0
ABDOMINAL PAIN: 0

## 2018-11-27 ASSESSMENT — PAIN SCALES - GENERAL: PAINLEVEL_OUTOF10: 0

## 2018-11-27 NOTE — PROGRESS NOTES
Patient alert/oriented x4,room air,on legal hold for SI with 1:1 sitter at bedside,ambulatory,calmed and pleasant.

## 2018-11-27 NOTE — PROGRESS NOTES
Patient is alert and oriented x4. Denied pain and discomfort. Slept through the whole morning. Up independently in the room and hallways. Remains a legal hold with sitter at bedside for safety. Safety precautions in place.

## 2018-11-27 NOTE — PSYCHIATRY
"PSYCHIATRIC FOLLOW UP:    Reason for Admission: 38 year old male admitted for alcohol intoxication  Legal hold status:   On hold, extended -- discontinued   Psychiatric Supervising Attending: Dr. Le      HPI:    38 year old male with history of alcohol use disorder initially presented for alcohol detoxification and management of withdrawal symptoms. During hospitalization, patient endorsed suicidal ideation with plan to sell everything and drink and do substances until he  from this. He also noted an alternate plan that he was guarded about presenting.      Patient in room walking around room at time of interview today. He states he has tried to call some of the facilities he was provided names of without success with admission. He notes someone from [facility] came to speak with him today and indicated this could be a possibility with him. Spoke with patient's  who notes that  facility needs to return an assessment and accept the patient prior to further progress with transfer.     He states his mood is anxious as he is nervous about what will happen to him after discharge and where he will go. He denies suicidal ideation at this time and once again states he has not been suicidal but rather feels that if he gets discharged he is going to relapse with alcohol and substance use and is fearful of this. He feels like continued substance use and alcohol use will ultimately lead to his death or lead to him making poor choices that can put him in harmful situations or lead to him being arrested. When asked about options if patient does not have ability to transfer to rehab he states that he may be able to have enough money to get a room but \"will end up just sitting by myself drinking nonstop and I know it.\" He states his anxiety has been elevated when thinking about discharge and requests to have more frequent dosing of his Hydroxyzine as he feels it is beneficial when he takes it but it wears off " "before the next dose. Patient denies side effects due to use of medication.    Patient stated that he will follow outpatient rehabilitation in [place] if required but is hopeful to go to [facility] on team rounds with Dr. Le. Patient was advised that he may self present to [facility] and has been provided address and information about location. He has been advised that a wait list may be present however at this time.         Psychiatric Examination: observed phenomenon:  Vitals      Vitals:     11/26/18 1610   BP: 127/79   Pulse: 92   Resp: 16   Temp: 36.6 °C (97.8 °F)   SpO2: 96%         Musculoskeletal no psychomotor agitation or retardation noted on interview   Appearance: 38 year old male appropriate to stated age in hospital attire, well groomed   Thoughts: linear, logical, goal directed   Speech: regular volume, regular rate, no slurring of speech, no stuttering of speech   Mood: \"not bad a little anxious\"  Affect:Full range of affect, congruent to stated mood   SI/HI: Denies/denies  Attention/Alertness: Alert and Attentive      Memory:in tact   Orientation: oriented to self, place, situation    Fund of Knowledge: in tact      Insight/Judgement into symptoms fair/fair        Lab results/tests:   CMP and CBC reviewed     Assessment:  Unspecified Depressive Disorder  R/O Substance Induced Depressive D/O vs Major Depressive Disorder  Suicidal Ideation  Alcohol Use Disorder, Severe  R/O Bipolar Disorder      38 year old male with history of alcohol use disorder and unspecified depressive disorder presented with suicidal ideation. Patient has been demonstrating appropriate self care and future orientation. He denies suicidal ideation at this time although he presents with multiple risk factors including his limited social support currently, male sex, family history of psychiatric illness, and substance use. He repeatedly states that he does not have imminent thoughts of harming self and denies plan or intent " for suicide. He expresses frustration with inability to go to a rehabilitation facility and fear that he will have continued use of substances and alcohol which could subsequently in the long term lead to physical injury to himself or accidental injury in setting of being intoxicated. He has denied suicidal ideation since 11/23/18 and states previous statement about harm in the future is due to his fear of resuming substance and alcohol use and therefore having long term effects from this. He does not currently present as an imminent risk for himself. He has been appropriately caring for himself throughout hospitalization and has demonstrated persistent future orientation with researching rehabilitation facilities on his laptop and speaking with social work for opportunities. He expresses hope for continued care and ability to maintain sobriety.        Plan:  -Increase Hydroxyzine 25mg q4h PRN for anxiety  -Continue Prozac 30mg daily for mood symptoms  -Patient has been provided information about [facility] and [facility] as well as locations of several other rehabilitation facilities in [facility]. He has been noted to have names of facilities in [place] that he has called and also feels he can go to outpatient treatment in [place] if required.   -Recommend outpatient prescription for 14 days for patient for Hydroxyzine, Prozac, and Trazodone   -Patient should have outpatient follow up with PCP established prior to discharge to ensure he is able to continue receiving medications and has consistent outpatient care    legal hold:Discontinue     Will Follow

## 2018-11-27 NOTE — PROGRESS NOTES
Received report from NOC RN. Pt c/o itching with redness behind knees and on hands and back.  ordered capsaicin ointment. Pt pacing in his room at times, med compliant and friendly t/o shift. 1:1 Sitter at bedside.

## 2018-11-27 NOTE — CARE PLAN
Problem: Safety  Goal: Will remain free from injury    Intervention: Provide assistance with mobility  Patient is on legal hold with 1:1 sitter at bedside and ambulate with steady gait.

## 2018-11-27 NOTE — CARE PLAN
Problem: Safety  Goal: Will remain free from injury  Outcome: PROGRESSING AS EXPECTED  Patient remains free from falls.    Problem: Pain Management  Goal: Pain level will decrease to patient's comfort goal  Outcome: PROGRESSING AS EXPECTED  Denied pain and discomfort.

## 2018-11-27 NOTE — PROGRESS NOTES
Internal Medicine Interval Note  Note Author: Flaco Ren M.D.     Name Jef Spence       1980   Age/Sex 38 y.o. male   MRN [id number]   Code Status Full     After 5PM or if no immediate response to page, please call for cross-coverage  Attending/Team: Isaiah Lang See Patient List for primary contact information  Call [phone] to page    1st Call - Day Intern (R1):   Dr. Ren 2nd Call - Day Sr. Resident (R2/R3):   Dr. Betancourt         Reason for interval visit  (Principal Problem)   Alcohol Detox/withdrawal      Interval Problem Daily Status Update  (24 hours, problem oriented, brief subjective history, new lab/imaging data pertinent to that problem)     -No acute events overnight.  Patient denies active suicidal ideation or homicidal ideation.     -Patient has been having some itch over his eyes, elbows, posterior knee.  Observed at the bedside, looks like sequelae of scratching.  Capsaicin cream ordered for the patient as needed.        Review of Systems   Constitutional: Negative for chills and fever.   Respiratory: Negative for cough and shortness of breath.    Cardiovascular: Negative for chest pain and palpitations.   Gastrointestinal: Negative for abdominal pain, blood in stool and vomiting.   Genitourinary: Negative for dysuria.   Neurological: Negative for dizziness and tremors.   Psychiatric/Behavioral: The patient is nervous/anxious.        Disposition/Barriers to discharge:   Uncertain - mental health facility vs home with rehab  Barrieres suicidal ideation    Consultants/Specialty  none  PCP: No primary care provider on file.      Quality Measures  Quality-Core Measures   Reviewed items::  Labs reviewed and Medications reviewed  Johnson catheter::  No Johnson  DVT prophylaxis pharmacological::  Enoxaparin (Lovenox)          Physical Exam       Vitals:    18 1610 18 1900 18 0400 18 0700   BP: 127/79 120/96 100/73 (!) 91/64   Pulse: 92 70 69 77   Resp: 16 16 16 18    Temp: 36.6 °C (97.8 °F) 36.8 °C (98.2 °F) 36.2 °C (97.2 °F) 36.2 °C (97.2 °F)   TempSrc: Temporal Temporal Temporal Temporal   SpO2: 96% 98% 96% 95%   Weight:       Height:         Body mass index is 27.02 kg/m².    Oxygen Therapy:  Pulse Oximetry: 95 %, O2 (LPM): 0, O2 Delivery: None (Room Air)    Physical Exam   Constitutional: He is oriented to person, place, and time and well-developed, well-nourished, and in no distress.   HENT:   Head: Normocephalic and atraumatic.   Eyes: Pupils are equal, round, and reactive to light. EOM are normal.   Neck: Neck supple.   Musculoskeletal: Normal range of motion. He exhibits no edema or tenderness.   Neurological: He is alert and oriented to person, place, and time.   Skin: Skin is dry. No rash noted.   Psychiatric: Affect normal.         Assessment/Plan     * Alcohol abuse- (present on admission)   Assessment & Plan    -Presented to ED immediately after drinking claiming wanting to detox.  -Etoh level 0.29 on admit  -CIWA protocol discontinued -no seizures noted  -Attempted to quit multiple times, requesting assistance    -Continue oral thiamine, folate, multivitamin       Suicidal ideations   Assessment & Plan    -thoughts of SI, and that they have been on and off for 1 year.  -plan to commit suicide with drugs and alcohol  -legal hold  -psychiatry is following         Depression with anxiety   Assessment & Plan      -increased fluoxetine dosage to 30mg per day from home dose 10 mg  -hydroxyzine 25mg q6hrs for anxiety     Insomnia- (present on admission)   Assessment & Plan    Continue home trazodone     Arthritis- (present on admission)   Assessment & Plan    Continue home diclofenac and gabapentin     Asthma   Assessment & Plan    Albuterol prn

## 2018-11-27 NOTE — PSYCHIATRY
"PSYCHIATRIC FOLLOW UP:    Reason for Admission: 38 year old male admitted for alcohol intoxication  Legal hold status:   On hold, extended   Psychiatric Supervising Attending: Dr. Le      HPI:    38 year old male with history of alcohol use disorder initially presented for alcohol detoxification and management of withdrawal symptoms. During hospitalization, patient endorsed suicidal ideation with plan to sell everything and drink and do substances until he  from this. He also noted an alternate plan that he was guarded about presenting.     On interview, patient is sitting on a chair in his room with his laptop present. He shows this writer a list of places he has received from social work that he can call for rehabilitation. He states he has been looking up places in [place] as he has [employer] and is unable to have anything locally covered by insurance. He expresses anxiety and fear with calling the places but states \"I really just want some help and to stop drinking.\" When asked about suicidal ideation patient denies this. He states he rather feels that if he leaves without a plan he will continue to drink heavily and will end up dying from his alcohol use stating \"this disease will kill me and I just want help\". He expressed that he will call and speak with people from the facilities listed for a possible discharge plan.    He states his mood has been \"okay just trying to figure things out\". He has been tolerating increase in Prozac well and denies side effects at this time. He states he has been eating and sleeping well.          Psychiatric Examination: observed phenomenon:  Vitals  Vitals:    18 1610   BP: 127/79   Pulse: 92   Resp: 16   Temp: 36.6 °C (97.8 °F)   SpO2: 96%       Musculoskeletal no psychomotor agitation or retardation noted on interview   Appearance: 38 year old male appropriate to stated age in hospital attire, well groomed sitting with laptop present in front on " "seat  Thoughts: linear, logical  Speech: regular volume, regular rate, no slurring of speech, no stuttering of speech   Mood: \"okay just trying to get things figured out\"  Affect:Full range of affect, congruent to stated mood   SI/HI: Denies/denies  Attention/Alertness: Alert and Attentive      Memory:in tact   Orientation: oriented to self, place, situation    Fund of Knowledge: in tact      Insight/Judgement into symptoms fair/fair        Lab results/tests:   CMP and CBC reviewed     Assessment:  Unspecified Depressive Disorder  R/O Substance Induced Depressive D/O vs Major Depressive Disorder  Suicidal Ideation  Alcohol Use Disorder, Severe  R/O Bipolar Disorder      38 year old male with history of alcohol use disorder and unspecified depressive disorder presented with suicidal ideation. Patient has been demonstrating appropriate self care and future orientation. He denies suicidal ideation at this time although he presents with multiple risk factors including his limited social support currently, male sex, family history of psychiatric illness, and substance use. He presents as a continued high risk if he does not have stable discharge plan at this time.      Plan:  -Continue Hydroxyzine 25mg q6h PRN for anxiety  -Continue Prozac 30mg daily for mood symptoms  -Patient notes he is attempting to establish safe discharge to rehabilitation facility   -Will obtain further collateral from patient family for safety of patient    legal hold:on hold, extended     Will Follow                                  "

## 2018-11-28 PROBLEM — R45.851 SUICIDAL IDEATIONS: Status: RESOLVED | Noted: 2018-11-22 | Resolved: 2018-11-28

## 2018-11-28 PROCEDURE — A9270 NON-COVERED ITEM OR SERVICE: HCPCS | Performed by: STUDENT IN AN ORGANIZED HEALTH CARE EDUCATION/TRAINING PROGRAM

## 2018-11-28 PROCEDURE — 700102 HCHG RX REV CODE 250 W/ 637 OVERRIDE(OP): Performed by: STUDENT IN AN ORGANIZED HEALTH CARE EDUCATION/TRAINING PROGRAM

## 2018-11-28 PROCEDURE — 770001 HCHG ROOM/CARE - MED/SURG/GYN PRIV*

## 2018-11-28 PROCEDURE — 99232 SBSQ HOSP IP/OBS MODERATE 35: CPT | Mod: GC | Performed by: INTERNAL MEDICINE

## 2018-11-28 RX ORDER — TRAZODONE HYDROCHLORIDE 150 MG/1
150 TABLET ORAL
Qty: 14 TAB | Refills: 0 | Status: SHIPPED | OUTPATIENT
Start: 2018-11-28

## 2018-11-28 RX ORDER — GABAPENTIN 300 MG/1
300 CAPSULE ORAL 3 TIMES DAILY
Qty: 21 CAP | Refills: 0 | Status: SHIPPED | OUTPATIENT
Start: 2018-11-28

## 2018-11-28 RX ORDER — FLUOXETINE 10 MG/1
30 CAPSULE ORAL DAILY
Qty: 42 CAP | Refills: 0 | Status: SHIPPED | OUTPATIENT
Start: 2018-11-29

## 2018-11-28 RX ORDER — HYDROXYZINE PAMOATE 25 MG/1
25 CAPSULE ORAL EVERY 4 HOURS PRN
Qty: 104 CAP | Refills: 0 | Status: SHIPPED | OUTPATIENT
Start: 2018-11-28

## 2018-11-28 RX ADMIN — HYDROXYZINE PAMOATE 25 MG: 25 CAPSULE ORAL at 09:01

## 2018-11-28 RX ADMIN — TRAZODONE HYDROCHLORIDE 150 MG: 150 TABLET ORAL at 20:02

## 2018-11-28 RX ADMIN — CAPSAICIN: 0.25 CREAM TOPICAL at 08:53

## 2018-11-28 RX ADMIN — NICOTINE POLACRILEX 2 MG: 2 GUM, CHEWING BUCCAL at 15:04

## 2018-11-28 RX ADMIN — HYDROXYZINE PAMOATE 25 MG: 25 CAPSULE ORAL at 20:01

## 2018-11-28 RX ADMIN — GABAPENTIN 300 MG: 300 CAPSULE ORAL at 17:58

## 2018-11-28 RX ADMIN — GABAPENTIN 300 MG: 300 CAPSULE ORAL at 04:32

## 2018-11-28 RX ADMIN — CAPSAICIN: 0.25 CREAM TOPICAL at 11:44

## 2018-11-28 RX ADMIN — NICOTINE POLACRILEX 2 MG: 2 GUM, CHEWING BUCCAL at 08:52

## 2018-11-28 RX ADMIN — FLUOXETINE 30 MG: 10 CAPSULE ORAL at 04:32

## 2018-11-28 RX ADMIN — HYDROXYZINE PAMOATE 25 MG: 25 CAPSULE ORAL at 15:04

## 2018-11-28 RX ADMIN — ALBUTEROL SULFATE 2 PUFF: 90 AEROSOL, METERED RESPIRATORY (INHALATION) at 08:53

## 2018-11-28 RX ADMIN — GABAPENTIN 300 MG: 300 CAPSULE ORAL at 11:44

## 2018-11-28 ASSESSMENT — ENCOUNTER SYMPTOMS
FEVER: 0
TREMORS: 0
ABDOMINAL PAIN: 0
DIZZINESS: 0
PALPITATIONS: 0
COUGH: 0
VOMITING: 0
SHORTNESS OF BREATH: 0
BLOOD IN STOOL: 0
CHILLS: 0
NERVOUS/ANXIOUS: 1

## 2018-11-28 ASSESSMENT — PAIN SCALES - GENERAL: PAINLEVEL_OUTOF10: 0

## 2018-11-28 NOTE — CARE PLAN
Problem: Safety  Goal: Will remain free from injury  Outcome: PROGRESSING AS EXPECTED  1:1 legal hold sitter at bedside    Problem: Psychosocial Needs:  Goal: Level of anxiety will decrease  Outcome: PROGRESSING AS EXPECTED  Pt's vistaril for anxiety increased from Q6 to Q4 today

## 2018-11-28 NOTE — PROGRESS NOTES
Still haven't heard back from [facility]. [facility] resident up here who stated that he Kenton texted MD. Sutherland to call this RN.

## 2018-11-28 NOTE — PROGRESS NOTES
Internal Medicine Interval Note  Note Author: Claudia Palmer M.D.     Name Jef Spence       1980   Age/Sex 38 y.o. male   MRN [id number]   Code Status Full     After 5PM or if no immediate response to page, please call for cross-coverage  Attending/Team: Isaiah Lang See Patient List for primary contact information  Call [phone] to page    1st Call - Day Intern (R1):   Dr. Ren 2nd Call - Day Sr. Resident (R2/R3):   Dr. Betancourt         Reason for interval visit  (Principal Problem)   Alcohol Detox/withdrawal and suicidal ideation      Interval Problem Daily Status Update  (24 hours, problem oriented, brief subjective history, new lab/imaging data pertinent to that problem)   -No events overnight, patient legal hold was discontinued, no suicidal ideation since  per the psychiatry note, medically clear to discharge, denies suicidal and homicidal ideation, patient acknowledged feeling stressed because he is concerned about drinking alcohol, spoke to  in the morning, patient is ready to discharge,  will call MD to place orders once discharge plan ready.  Will need to follow-up appointment with PCP and prescription of psych medications until his appointment with PCP.        Review of Systems   Constitutional: Negative for chills and fever.   Respiratory: Negative for cough and shortness of breath.    Cardiovascular: Negative for chest pain and palpitations.   Gastrointestinal: Negative for abdominal pain, blood in stool and vomiting.   Genitourinary: Negative for dysuria.   Neurological: Negative for dizziness and tremors.   Psychiatric/Behavioral: The patient is nervous/anxious.        Disposition/Barriers to discharge:   Uncertain - mental health facility vs home with rehab  Barrieres suicidal ideation    Consultants/Specialty  none  PCP: No primary care provider on file.      Quality Measures  Quality-Core Measures   Reviewed items::  Labs reviewed and  Medications reviewed  Johnson catheter::  No Johnson  DVT prophylaxis pharmacological::  Enoxaparin (Lovenox)          Physical Exam       Vitals:    11/27/18 1602 11/27/18 1900 11/28/18 0400 11/28/18 0738   BP: 107/74 130/72 103/72 (!) 90/69   Pulse: 86 89 69 75   Resp: 16 18 16 16   Temp: 36.1 °C (97 °F) 36.8 °C (98.2 °F) 36.2 °C (97.1 °F) 36.2 °C (97.2 °F)   TempSrc: Temporal Temporal Temporal Temporal   SpO2: 96% 94% 93% 94%   Weight:       Height:         Body mass index is 27.02 kg/m².    Oxygen Therapy:  Pulse Oximetry: 94 %, O2 (LPM): 0, O2 Delivery: None (Room Air)    Physical Exam   Constitutional: He is oriented to person, place, and time and well-developed, well-nourished, and in no distress.   HENT:   Head: Normocephalic and atraumatic.   Eyes: Pupils are equal, round, and reactive to light. EOM are normal.   Neck: Neck supple.   Musculoskeletal: Normal range of motion. He exhibits no edema or tenderness.   Neurological: He is alert and oriented to person, place, and time.   Skin: Skin is dry. No rash noted.   Psychiatric: Affect normal.         Assessment/Plan     * Alcohol abuse- (present on admission)   Assessment & Plan    -Presented to ED immediately after drinking claiming wanting to detox.  -Etoh level 0.29 on admit  -CIWA protocol discontinued -no seizures noted  -Attempted to quit multiple times, requesting assistance    -Continue oral thiamine, folate, multivitamin       Suicidal ideations   Assessment & Plan    -thoughts of SI, and that they have been on and off for 1 year.  -was planing to commit suicide with drugs and alcohol  -Was on legal hold, discontinued by psychiatry 11/28/2018  -Currently denies suicidal and homicidal ideation.  -psychiatry is following         Depression with anxiety   Assessment & Plan      -increased fluoxetine dosage to 30mg per day from home dose 10 mg  -hydroxyzine 25mg q4hrs for anxiety     Insomnia- (present on admission)   Assessment & Plan    Continue home  trazodone     Arthritis- (present on admission)   Assessment & Plan    Continue home diclofenac and gabapentin     Asthma   Assessment & Plan    Albuterol prn

## 2018-11-28 NOTE — DISCHARGE PLANNING
Anticipated Discharge Disposition: To [place] by [employer] today.    Action: This RN CM made arrangements with the Hospital Care Management  to order an [employer] to transport the patient to [place], where his doctor is and where he has a place to stay.  This RN CM's supervisor approved the [employer] transport.    Barriers to Discharge: Unable to reach Dr. Interiano.  This RN CM left 2 VM messages requesting him to call back.  The patient needs medications for one week before he leaves.  We may need to postpone the patient's discharge until tomorrow.    Plan: My supervisor said to page the attending, Dr. Dunne, which I did.  Await a call back.    Update: Dr. Interiano brought the prescriptions that the patient will need for discharge (gabapentin, hydroxyzine, fluoxetine and trazodone).  Faxed prescriptions to the [facility] and requested the cost so that we can do an Approved Service for the prescriptions.  The plan now is to send the patient to [place] on Thursday, 11/29, at about 11:00 AM, or once he has his prescriptions in hand.  [employer] will be set up in the morning.

## 2018-11-28 NOTE — PROGRESS NOTES
"Vidya CW in to speak with pt in regards to discharge. States that the doctor spoke with her and stated that he would be discharged today. Pt states \"No I was told that I wouldn't be discharged until tomorrow night or Friday morning. I know if I get discharged now I will just resort back to using alcohol again, you can't do this to me, I don't even have a place to go to.\" CW educated pt that since psych has signed off on him he doesn't need inpatient psych, that he can go to outpatient psych but that he would have to be discharged to a shelter or a motel. CW offered to call [facility] to see if maybe they had an opening for today, if not they do have one for him tomorrow so he would just have to stay at a hotel or shelter until then. Pt starts to pace room stating he needs to speak with a MD immediately. This RN paged [facility] to see if they could come speak with the pt. Pt also states that he has no income so he has no way of transporting himself to a PCP if he were to get one here or to [facility] if he were to get discharged today, that he has no income whatsoever. ARELI Dasilva continuing to work on case.   "

## 2018-11-28 NOTE — DISCHARGE PLANNING
Notice of withdrawal filed with the court via [employer], scanned copy of verified notice onto .

## 2018-11-28 NOTE — PROGRESS NOTES
Alert and able to let his needs known, ambulates and appears anxious at times. Cooperative and compliant. Cont plan of care, call light within reach and visual checks through the night

## 2018-11-28 NOTE — PROGRESS NOTES
ARELI Dasilva provided this RN with MD phone number, message left. Vidya also Tigeryn texted MD to place a discharge order and to hand deliver prescriptions to the unit since he can't get them filled anywhere here. Still waiting on a call back and discharge orders.

## 2018-11-28 NOTE — DISCHARGE PLANNING
Anticipated Discharge Disposition: [facility] vs shelter vs motel.    Action: This RN CM spoke with the patient about his discharge plan.  Psych discontinued his Legal Hold yesterday afternoon. Dr. Palmer, [facility] resident, said that once the patient has an appointment with a PCP he can discharge. The patient said that he was told he could discharge Thursday or Friday.  Advised the patient that no doctors, not even [facility], accept his [employer] insurance but that he can go to the [facility] beds at the [facility] and pay according to his income, which he said is zero.  He wants to talk to his doctor again.  This RN CM spoke with KRZYSZTOF Abarca, who had contacted [facility] yesterday. This morning he sent them an email asking if they can take the patient.  This RN CM called Dr. Palmer and advised him of my conversation with the patient and he said to try the [facility] to get a PCP.  Called the  and she said that this clinic as well doesn't accept the patient's [place] insurance.  I did advise Dr. Palmer that the patient would like to talk to him.    Barriers to Discharge: Patient states he's not ready to leave.  [facility] may not accept him.    Plan: Await response from [facility].  Will advise the patient that we are waiting to hear back from [facility].    Update:  [facility] does not accept the patient's insurance.  He knows he will need to discharge today and asked for a ride to [place], where he will have a place to stay.  Will check on possible transportation for him.

## 2018-11-28 NOTE — PROGRESS NOTES
MD Claudia Palmer up here writing prescriptions for the patient. Pt will be kiersten tomorrow as soon as [employer] is here to transport him back to [place]. MD also wrote a week prescription for gabapentin d/t patients current supply being in [place] which he will not be there until next week. Prescriptions given to feng SINGLETON.

## 2018-11-28 NOTE — CARE PLAN
Problem: Safety  Goal: Will remain free from injury  Outcome: PROGRESSING AS EXPECTED  Sitter dc this evening, up self ambulating around the unit    Problem: Discharge Barriers/Planning  Goal: Patient's continuum of care needs will be met  Outcome: PROGRESSING AS EXPECTED  Awaiting [facility] inpatient dc

## 2018-11-28 NOTE — PROGRESS NOTES
Pt is A&OX4. Reported feeling anxious, pharmacy was notified and primary nurse requested vistaril for pt, medication was administered upon arrival. Assessment complete. Continue to monitor for safety, behavioral changes, bed at low position, call light within reach.

## 2018-11-29 VITALS
WEIGHT: 182.98 LBS | HEIGHT: 69 IN | TEMPERATURE: 98.1 F | HEART RATE: 75 BPM | OXYGEN SATURATION: 97 % | SYSTOLIC BLOOD PRESSURE: 97 MMHG | BODY MASS INDEX: 27.1 KG/M2 | DIASTOLIC BLOOD PRESSURE: 65 MMHG | RESPIRATION RATE: 17 BRPM

## 2018-11-29 PROCEDURE — 99238 HOSP IP/OBS DSCHRG MGMT 30/<: CPT | Mod: GC | Performed by: INTERNAL MEDICINE

## 2018-11-29 PROCEDURE — A9270 NON-COVERED ITEM OR SERVICE: HCPCS | Performed by: STUDENT IN AN ORGANIZED HEALTH CARE EDUCATION/TRAINING PROGRAM

## 2018-11-29 PROCEDURE — 700102 HCHG RX REV CODE 250 W/ 637 OVERRIDE(OP): Performed by: STUDENT IN AN ORGANIZED HEALTH CARE EDUCATION/TRAINING PROGRAM

## 2018-11-29 RX ADMIN — NICOTINE POLACRILEX 2 MG: 2 GUM, CHEWING BUCCAL at 10:52

## 2018-11-29 RX ADMIN — GABAPENTIN 300 MG: 300 CAPSULE ORAL at 05:08

## 2018-11-29 RX ADMIN — HYDROXYZINE PAMOATE 25 MG: 25 CAPSULE ORAL at 00:01

## 2018-11-29 RX ADMIN — HYDROXYZINE PAMOATE 25 MG: 25 CAPSULE ORAL at 10:52

## 2018-11-29 RX ADMIN — FLUOXETINE 30 MG: 10 CAPSULE ORAL at 05:08

## 2018-11-29 ASSESSMENT — PAIN SCALES - GENERAL: PAINLEVEL_OUTOF10: 0

## 2018-11-29 NOTE — PROGRESS NOTES
Alert and able to let his needs known, c/o anxiety; medicate as requested. Planning on dc tomorrow with uber to [place]. cooperative and compliant with care. Cont plan of care, call light within reach and visual checks through the night

## 2018-11-29 NOTE — CARE PLAN
Problem: Safety  Goal: Will remain free from injury  Outcome: PROGRESSING AS EXPECTED  Patient legal hold discontinued. Discharge order in place - to discharge home this afternoon. No additional SI at this time.    Problem: Psychosocial Needs:  Goal: Level of anxiety will decrease  Outcome: PROGRESSING SLOWER THAN EXPECTED  C/o anxiety this AM, medicated per MAR.

## 2018-11-29 NOTE — DISCHARGE INSTRUCTIONS
Alcohol Withdrawal  Introduction  When a person who drinks a lot of alcohol stops drinking, he or she may go through alcohol withdrawal. Alcohol withdrawal causes problems. It can make you feel:  · Tired (fatigued).  · Sad (depressed).  · Fearful (anxious).  · Grouchy (irritable).  · Not hungry.  · Sick to your stomach (nauseous).  · Shaky.  It can also make you have:  · Nightmares.  · Trouble sleeping.  · Trouble thinking clearly.  · Mood swings.  · Clammy skin.  · Very bad sweating.  · A very fast heartbeat.  · Shaking that you cannot control (tremor).  · Having a fever.  · A fit of movements that you cannot control (seizure).  · Confusion.  · Throwing up (vomiting).  · Feeling or seeing things that are not there (hallucinations).  Follow these instructions at home:  · Take medicines and vitamins only as told by your doctor.  · Do not drink alcohol.  · Have someone around in case you need help.  · Drink enough fluid to keep your pee (urine) clear or pale yellow.  · Think about joining a group to help you stop drinking.  Contact a doctor if:  · Your problems get worse.  · Your problems do not go away.  · You cannot eat or drink without throwing up.  · You are having a hard time not drinking alcohol.  · You cannot stop drinking alcohol.  Get help right away if:  · You feel your heart beating differently than usual.  · Your chest hurts.  · You have trouble breathing.  · You have very bad problems, like:  ¨ A fever.  ¨ A fit of movements that you cannot control.  ¨ Being very confused.  ¨ Feeling or seeing things that are not there.      Document Released: 01/25/2006 Document Revised: 04/10/2017 Document Reviewed: 05/06/2014  [employer] Interactive Patient Education © 2017 [employer]      Suicidal Feelings: How to Help Yourself  Suicide is the taking of one's own life. If you feel as though life is getting too tough to handle and are thinking about suicide, get help right away. To get help:  · Call your local  emergency services (911 in the [place]  · Call a suicide hotline to speak with a trained counselor who understands how you are feeling. The following is a list of suicide hotlines in the [place]. For a list of hotlines in [place], visit www.suicide.org/hotlines/international/moxgnp-yscvwmx-pglcznby.html.  ¨ 2-387-637-TALK (1-465.597.6328).  ¨ 6-416-AJZCOCT (1-397.112.7994).  ¨ 1-229.491.4448. This is a hotline for Persian speakers.  ¨ 7-348-937-4TTY (1-749.470.6689). This is a hotline for TTY users.  ¨ 9-265-6-U-TAHIR (1-827.792.2811). This is a hotline for lesbian, pacheco, bisexual, transgender, or questioning youth.  · Contact a crisis center or a local suicide prevention center. To find a crisis center or suicide prevention center:  ¨ Call your local hospital, clinic, community service organization, mental health center, social service provider, or health department. Ask for assistance in connecting to a crisis center.  ¨ Visit www.suicidepreventionlifeline.org/getinvolved/ for a list of crisis centers in the [place], or visit www.suicideprevention.ca/ktboulqm-efneb-bljwnob/find-a-crisis-centre for a list of centers in [place].  · Visit the following websites:  ¨ National Suicide Prevention Lifeline: www.suicidepreventionlifeline.org  ¨ Hopeline: www.hopeline.Savveo  ¨ American Foundation for Suicide Prevention: www.afsp.org  ¨ The Tahir Project (for lesbian, pacheco, bisexual, transgender, or questioning youth): www.thetrevorproject.org  How can I help myself feel better?  · Promise yourself that you will not do anything drastic when you have suicidal feelings. Remember, there is hope. Many people have gotten through suicidal thoughts and feelings, and you will, too. You may have gotten through them before, and this proves that you can get through them again.  · Let family, friends, teachers, or counselors know how you are feeling. Try not to isolate yourself from those who care about you. Remember, they  will want to help you. Talk with someone every day, even if you do not feel sociable. Face-to-face conversation is best.  · Call a mental health professional and see one regularly.  · Visit your primary health care provider every year.  · Eat a well-balanced diet, and space your meals so you eat regularly.  · Get plenty of rest.  · Avoid alcohol and drugs, and remove them from your home. They will only make you feel worse.  · If you are thinking of taking a lot of medicine, give your medicine to someone who can give it to you one day at a time. If you are on antidepressants and are concerned you will overdose, let your health care provider know so he or she can give you safer medicines. Ask your mental health professional about the possible side effects of any medicines you are taking.  · Remove weapons, poisons, knives, and anything else that could harm you from your home.  · Try to stick to routines. Follow a schedule every day. Put self-care on your schedule.  · Make a list of realistic goals, and cross them off when you achieve them. Accomplishments give a sense of worth.  · Wait until you are feeling better before doing the things you find difficult or unpleasant.  · Exercise if you are able. You will feel better if you exercise for even a half hour each day.  · Go out in the sun or into nature. This will help you recover from depression faster. If you have a favorite place to walk, go there.  · Do the things that have always given you pleasure. Play your favorite music, read a good book, paint a picture, play your favorite instrument, or do anything else that takes your mind off your depression if it is safe to do.  · Keep your living space well lit.  · When you are feeling well, write yourself a letter about tips and support that you can read when you are not feeling well.  · Remember that life’s difficulties can be sorted out with help. Conditions can be treated. You can work on thoughts and strategies that  serve you well.  This information is not intended to replace advice given to you by your health care provider. Make sure you discuss any questions you have with your health care provider.  Document Released: 06/23/2004 Document Revised: 08/16/2017 Document Reviewed: 04/14/2015  [employer] Interactive Patient Education © 2017 [employer]        Discharge Instructions    Discharged to home by uber with escort. Discharged via walking, hospital escort: Yes.  Special equipment needed: Not Applicable    Be sure to schedule a follow-up appointment with your primary care doctor or any specialists as instructed.     Discharge Plan:   Diet Plan: Discussed  Activity Level: Discussed  Smoking Cessation Offered: Patient Refused  Confirmed Follow up Appointment: Patient to Call and Schedule Appointment  Confirmed Symptoms Management: Discussed  Medication Reconciliation Updated: Yes  Pneumococcal Vaccine Administered/Refused: Given (See MAR)  Influenza Vaccine Indication: Patient Refuses    I understand that a diet low in cholesterol, fat, and sodium is recommended for good health. Unless I have been given specific instructions below for another diet, I accept this instruction as my diet prescription.   Other diet: Regular      · Is patient discharged on Warfarin / Coumadin?   No     Depression / Suicide Risk    As you are discharged from this [facility] facility, it is important to learn how to keep safe from harming yourself.    Recognize the warning signs:  · Abrupt changes in personality, positive or negative- including increase in energy   · Giving away possessions  · Change in eating patterns- significant weight changes-  positive or negative  · Change in sleeping patterns- unable to sleep or sleeping all the time   · Unwillingness or inability to communicate  · Depression  · Unusual sadness, discouragement and loneliness  · Talk of wanting to die  · Neglect of personal appearance   · Rebelliousness- reckless  behavior  · Withdrawal from people/activities they love  · Confusion- inability to concentrate     If you or a loved one observes any of these behaviors or has concerns about self-harm, here's what you can do:  · Talk about it- your feelings and reasons for harming yourself  · Remove any means that you might use to hurt yourself (examples: pills, rope, extension cords, firearm)  · Get professional help from the community (Mental Health, Substance Abuse, psychological counseling)  · Do not be alone:Call your Safe Contact- someone whom you trust who will be there for you.  · Call your local CRISIS HOTLINE 343-7353 or 643-919-7661  · Call your local Children's Mobile Crisis Response Team [place] (203) 222-6311 or www.Verdezyne  · Call the toll free National Suicide Prevention Hotlines   · National Suicide Prevention Lifeline 666-296-GVXD (7407)  · National Hope Line Network 800-SUICIDE (340-0382)

## 2018-11-29 NOTE — PROGRESS NOTES
AAOx4. C/o 0/10 pain, declining intervention at this time. -N/V. -N/T. Denies new onset of chest pain/SOB. +BS in all 4 quadrants, last BM today per pt. Up self, steady. POC discussed, denies further needs at this time - patient to discharge home today. Call light within reach & hourly rounding in place.

## 2018-11-30 NOTE — DISCHARGE PLANNING
"This RNCM Supervisor was notified by PAOLA Dasilva that pt's Uber  was on the phone with the bedside RN (also present during this conversation) and the Uber  stated that he was stopped at [place] and unable to proceed to the requested destination of [place], [place] due to road conditions and chain requirements, and the Uber  did not have chains.    Uber  stated he could not stay with patient and wait for another Uber  who would be able to handle road conditions/requirements and take patient the remainder distance to [place].    Uber  was directed to return patient to [facility] ED for patient safety and a LSW or RNCM would meet them.    Uber  and patient was met by PAOLA Dasilva upon their return.  Patient stated he had a ride to come pick him up in [place] on Monday and requested rooming assistance until then. Leadership provided Approved Services for 4-night stay (11/29-12/3/2018) at [facility], [facility] [address] [place] [address], [place] [address], P [phone] and worked with [facility] staff to coordinate reservation.      LSW confirmed with pt that he had means to pay for food while in [place].  Pt stated \"I have a credit card\". Pt provided taxi voucher by ED LSW from [facility] to [facility].    "

## 2018-11-30 NOTE — PROGRESS NOTES
Patient dcd via uber, escorted down to be picked up by this RN. Prescriptions sent home with patient. Returned patient's E-cig. Discharge instructions given & signed copy placed in chart. Denies further needs & has no further questions at this time.

## 2018-11-30 NOTE — DISCHARGE SUMMARY
Internal Medicine Discharge Summary  Note Author: Flaco Ren M.D.       Name Jef Spence       1980   Age/Sex 38 y.o. male   MRN [id number]         Admit Date:  2018       Discharge Date:   18      Service:   [facility] Internal Medicine Gray Team  Attending Physician(s):   Dr. Dunne       Senior Resident(s):   Dr. Ren  Epi Resident(s):   Dr. Martell  PCP: No primary care provider on file.      Primary Diagnosis:   Alcohol Detox  Suicidal ideation    Secondary Diagnoses:                Principal Problem:    Alcohol abuse POA: Yes  Active Problems:    Depression with anxiety POA: Yes    Asthma POA: Yes    Arthritis POA: Yes    Insomnia POA: Yes  Resolved Problems:    Suicidal ideations POA: No      Hospital Summary (Brief Narrative):       Mr. Spence is a pleasant 38-year-old male who presents to the hospital wanting to detox from alcohol.  Past medical history includes alcohol abuse about approximately 1-2 L of liquor per day.  His last drink was at 6 PM on day of admit of admission, he was acutely intoxicated with a blood alcohol level of 0.29, he had auditory hallucinations in the ED.  Denies history of seizures secondary to withdrawal.  He was treated with standard [facility] protocol.  Patient condition improved, was alert and oriented x4, on discharge planning patient start stating suicidal ideation, his antidepressant dose was increased, he was placed on a legal hold, psychiatry consulted who increased his antidepressant medication dose and extended the legal hold. Per Psychiatry, fluoxetine increased to 30 mg daily, and Hydroxyzine 25mg q4 hours PRN started for anxiety.  At time of discharge patient denies suicidal/homicial ideation.  He will be discharged with prescriptions for 14 days, and to follow up with primary care physician in [place].    He is therefore discharged in fair medical condition.    Patient /Hospital Summary (Details -- Problem Oriented) :          * Alcohol abuse    Assessment & Plan    -Presented to ED immediately after drinking claiming wanting to detox.  -Etoh level 0.29 on admit  -CIWA protocol discontinued -no seizures noted  -Attempted to quit multiple times, requesting assistance    -Continue oral thiamine, folate, multivitamin       Suicidal ideations-resolved as of 11/28/2018   Assessment & Plan    -thoughts of SI, and that they have been on and off for 1 year.  -was planing to commit suicide with drugs and alcohol  -Was on legal hold, discontinued by psychiatry 11/28/2018  -Currently denies suicidal and homicidal ideation.  -psychiatry is following         Depression with anxiety   Assessment & Plan      -increased fluoxetine dosage to 30mg per day from home dose 10 mg  -hydroxyzine 25mg q4hrs for anxiety     Insomnia   Assessment & Plan    Continue home trazodone     Arthritis   Assessment & Plan    Continue home diclofenac and gabapentin     Asthma   Assessment & Plan    Albuterol prn            Consultants:     Psychiatry - Dr. Le    Procedures:        none    Imaging/ Testing:      No orders to display         Discharge Medications:         Medication Reconciliation: Completed       Medication List      START taking these medications      Instructions   hydrOXYzine pamoate 25 MG Caps  Commonly known as:  VISTARIL   Take 1 Cap by mouth every four hours as needed for Anxiety.  Dose:  25 mg        CHANGE how you take these medications      Instructions   FLUoxetine 10 MG Caps  What changed:  · medication strength  · how much to take  · Another medication with the same name was removed. Continue taking this medication, and follow the directions you see here.  Commonly known as:  PROZAC   Take 3 Caps by mouth every day.  Dose:  30 mg     traZODone 150 MG Tabs  What changed:  · medication strength  · how much to take  · when to take this  Commonly known as:  DESYREL   Take 1 Tab by mouth every bedtime.  Dose:  150 mg        CONTINUE taking these medications       Instructions   diclofenac EC 50 MG Tbec  Commonly known as:  VOLTAREN   Take 50 mg by mouth 1 time daily as needed (for pain).  Dose:  50 mg     gabapentin 300 MG Caps  Commonly known as:  NEURONTIN   Take 1 Cap by mouth 3 times a day.  Dose:  300 mg            Can use .DISCHARGEMEDSLIST if going to another facility         Disposition:   To home in [place]    Diet:   Normal     Activity:   As tolerated    Instructions:         The patient was instructed to return to the ER in the event of worsening symptoms. I have counseled the patient on the importance of compliance and the patient has agreed to proceed with all medical recommendations and follow up plan indicated above.   The patient understands that all medications come with benefits and risks. Risks may include permanent injury or death and these risks can be minimized with close reassessment and monitoring.        Primary Care Provider:    No primary care provider on file.  Pt states has appoint with Doctor in [place]  Discharge summary faxed to primary care provider:  Deferred  Copy of discharge summary given to the patient: Completed      Follow up appointment details :      Please follow up with with PCP in 1-2 weeks for follow up appointment.    Pending Studies:        none    Time spent on discharge day patient visit, preparing discharge paperwork and arranging for patient follow up.    Summary of follow up issues:   Suicidal Ideation    Alcohol abuse    Depression unspecified - fluoxetine increased to 30mg daily.  Hydroxyzine PRN for anxiety    Discharge Time (Minutes) :    58  Hospital Course Type:  Inpatient Stay >2 midnights      Condition on Discharge    ______________________________________________________________________    Interval history/exam for day of discharge:         -patient feeling well, no acute vents overnight.  Denies SI/HI.  Affect normal.  Prescriptions given to him by [facility].  States he has a doctor in [place] whom he will  follow up with within 1 week.       Constitutional: He is oriented to person, place, and time and well-developed, well-nourished, and in no distress  Head: Normocephalic and atraumatic.   Eyes:EOM are normal.   Neck: Neck supple.   Musculoskeletal: Normal range of motion. He exhibits no edema or tenderness.   Neurological: He is alert and oriented to person, place, and time.   Skin: Skin is dry. No rash noted.   Psychiatric: Affect normal.  Denies suicidal ideation, homicidal ideation, depression. Positive for anxiety      Most Recent Labs:    Lab Results   Component Value Date/Time    WBC 8.7 11/22/2018 03:29 AM    RBC 4.61 (L) 11/22/2018 03:29 AM    HEMOGLOBIN 15.0 11/22/2018 03:29 AM    HEMATOCRIT 43.4 11/22/2018 03:29 AM    MCV 94.1 11/22/2018 03:29 AM    MCH 32.5 11/22/2018 03:29 AM    MCHC 34.6 11/22/2018 03:29 AM    MPV 9.4 11/22/2018 03:29 AM    NEUTSPOLYS 59.60 11/22/2018 03:29 AM    LYMPHOCYTES 26.90 11/22/2018 03:29 AM    MONOCYTES 9.50 11/22/2018 03:29 AM    EOSINOPHILS 2.90 11/22/2018 03:29 AM    BASOPHILS 0.60 11/22/2018 03:29 AM      Lab Results   Component Value Date/Time    SODIUM 138 11/22/2018 03:29 AM    POTASSIUM 3.9 11/22/2018 03:29 AM    CHLORIDE 107 11/22/2018 03:29 AM    CO2 22 11/22/2018 03:29 AM    GLUCOSE 94 11/22/2018 03:29 AM    BUN 8 11/22/2018 03:29 AM    CREATININE 0.99 11/22/2018 03:29 AM      Lab Results   Component Value Date/Time    ALTSGPT 14 11/22/2018 03:29 AM    ASTSGOT 15 11/22/2018 03:29 AM    ALKPHOSPHAT 70 11/22/2018 03:29 AM    TBILIRUBIN 0.6 11/22/2018 03:29 AM    LIPASE 45 11/19/2018 09:07 PM    ALBUMIN 3.7 11/22/2018 03:29 AM    GLOBULIN 2.5 11/22/2018 03:29 AM    INR 0.96 11/19/2018 09:07 PM     Lab Results   Component Value Date/Time    PROTHROMBTM 12.8 11/19/2018 09:07 PM    INR 0.96 11/19/2018 09:07 PM

## 2020-06-29 NOTE — PROGRESS NOTES
Internal Medicine Discharge Summary  Note Author: Flaco Ren M.D.       Name Jef Spence       1980   Age/Sex 38 y.o. male   MRN [id number]         Admit Date:  2018       Discharge Date:   18      Service:   [facility] Internal Medicine Gray Team  Attending Physician(s):   Dr. Dunne       Senior Resident(s):   Dr. Ren  Epi Resident(s):   Dr. Martell  PCP: No primary care provider on file.      Primary Diagnosis:   Alcohol Detox  Suicidal ideation    Secondary Diagnoses:                Principal Problem:    Alcohol abuse POA: Yes  Active Problems:    Depression with anxiety POA: Yes    Asthma POA: Yes    Arthritis POA: Yes    Insomnia POA: Yes  Resolved Problems:    Suicidal ideations POA: No      Hospital Summary (Brief Narrative):       Mr. Spence is a pleasant 38-year-old male who presents to the hospital wanting to detox from alcohol.  Past medical history includes alcohol abuse about approximately 1-2 L of liquor per day.  His last drink was at 6 PM on day of admit of admission, he was acutely intoxicated with a blood alcohol level of 0.29, he had auditory hallucinations in the ED.  Denies history of seizures secondary to withdrawal.  He was treated with standard [facility] protocol.  Patient condition improved, was alert and oriented x4, on discharge planning patient start stating suicidal ideation, his antidepressant dose was increased, he was placed on a legal hold, psychiatry consulted who increased his antidepressant medication dose and extended the legal hold. Per Psychiatry, fluoxetine increased to 30 mg daily, and Hydroxyzine 25mg q4 hours PRN started for anxiety.  At time of discharge patient denies suicidal/homicial ideation.  He will be discharged with prescriptions for 14 days, and to follow up with primary care physician in [place].    He is therefore discharged in fair medical condition.    Patient /Hospital Summary (Details -- Problem Oriented) :          * Alcohol abuse      Assessment & Plan    -Presented to ED immediately after drinking claiming wanting to detox.  -Etoh level 0.29 on admit  -CIWA protocol discontinued -no seizures noted  -Attempted to quit multiple times, requesting assistance    -Continue oral thiamine, folate, multivitamin       Suicidal ideations-resolved as of 11/28/2018   Assessment & Plan    -thoughts of SI, and that they have been on and off for 1 year.  -was planing to commit suicide with drugs and alcohol  -Was on legal hold, discontinued by psychiatry 11/28/2018  -Currently denies suicidal and homicidal ideation.  -psychiatry is following         Depression with anxiety   Assessment & Plan      -increased fluoxetine dosage to 30mg per day from home dose 10 mg  -hydroxyzine 25mg q4hrs for anxiety     Insomnia   Assessment & Plan    Continue home trazodone     Arthritis   Assessment & Plan    Continue home diclofenac and gabapentin     Asthma   Assessment & Plan    Albuterol prn            Consultants:     Psychiatry - Dr. Le    Procedures:        none    Imaging/ Testing:      No orders to display         Discharge Medications:         Medication Reconciliation: Completed       Medication List      START taking these medications      Instructions   hydrOXYzine pamoate 25 MG Caps  Commonly known as:  VISTARIL   Take 1 Cap by mouth every four hours as needed for Anxiety.  Dose:  25 mg        CHANGE how you take these medications      Instructions   FLUoxetine 10 MG Caps  What changed:  · medication strength  · how much to take  · Another medication with the same name was removed. Continue taking this medication, and follow the directions you see here.  Commonly known as:  PROZAC   Take 3 Caps by mouth every day.  Dose:  30 mg     traZODone 150 MG Tabs  What changed:  · medication strength  · how much to take  · when to take this  Commonly known as:  DESYREL   Take 1 Tab by mouth every bedtime.  Dose:  150 mg        CONTINUE taking these medications       Include Location In Plan?: No Detail Level: Zone Instructions   diclofenac EC 50 MG Tbec  Commonly known as:  VOLTAREN   Take 50 mg by mouth 1 time daily as needed (for pain).  Dose:  50 mg     gabapentin 300 MG Caps  Commonly known as:  NEURONTIN   Take 1 Cap by mouth 3 times a day.  Dose:  300 mg            Can use .DISCHARGEMEDSLIST if going to another facility         Disposition:   To home in [place]    Diet:   Normal     Activity:   As tolerated    Instructions:         The patient was instructed to return to the ER in the event of worsening symptoms. I have counseled the patient on the importance of compliance and the patient has agreed to proceed with all medical recommendations and follow up plan indicated above.   The patient understands that all medications come with benefits and risks. Risks may include permanent injury or death and these risks can be minimized with close reassessment and monitoring.        Primary Care Provider:    No primary care provider on file.  Pt states has appoint with Doctor in [place]  Discharge summary faxed to primary care provider:  Deferred  Copy of discharge summary given to the patient: Completed      Follow up appointment details :      Please follow up with with PCP in 1-2 weeks for follow up appointment.    Pending Studies:        none    Time spent on discharge day patient visit, preparing discharge paperwork and arranging for patient follow up.    Summary of follow up issues:   Suicidal Ideation    Alcohol abuse    Depression unspecified - fluoxetine increased to 30mg daily.  Hydroxyzine PRN for anxiety    Discharge Time (Minutes) :    58  Hospital Course Type:  Inpatient Stay >2 midnights      Condition on Discharge    ______________________________________________________________________    Interval history/exam for day of discharge:         -patient feeling well, no acute vents overnight.  Denies SI/HI.  Affect normal.  Prescriptions given to him by [facility].  States he has a doctor in [place] whom he will  follow up with within 1 week.       Constitutional: He is oriented to person, place, and time and well-developed, well-nourished, and in no distress  Head: Normocephalic and atraumatic.   Eyes:EOM are normal.   Neck: Neck supple.   Musculoskeletal: Normal range of motion. He exhibits no edema or tenderness.   Neurological: He is alert and oriented to person, place, and time.   Skin: Skin is dry. No rash noted.   Psychiatric: Affect normal.  Denies suicidal ideation, homicidal ideation, depression. Positive for anxiety      Most Recent Labs:    Lab Results   Component Value Date/Time    WBC 8.7 11/22/2018 03:29 AM    RBC 4.61 (L) 11/22/2018 03:29 AM    HEMOGLOBIN 15.0 11/22/2018 03:29 AM    HEMATOCRIT 43.4 11/22/2018 03:29 AM    MCV 94.1 11/22/2018 03:29 AM    MCH 32.5 11/22/2018 03:29 AM    MCHC 34.6 11/22/2018 03:29 AM    MPV 9.4 11/22/2018 03:29 AM    NEUTSPOLYS 59.60 11/22/2018 03:29 AM    LYMPHOCYTES 26.90 11/22/2018 03:29 AM    MONOCYTES 9.50 11/22/2018 03:29 AM    EOSINOPHILS 2.90 11/22/2018 03:29 AM    BASOPHILS 0.60 11/22/2018 03:29 AM      Lab Results   Component Value Date/Time    SODIUM 138 11/22/2018 03:29 AM    POTASSIUM 3.9 11/22/2018 03:29 AM    CHLORIDE 107 11/22/2018 03:29 AM    CO2 22 11/22/2018 03:29 AM    GLUCOSE 94 11/22/2018 03:29 AM    BUN 8 11/22/2018 03:29 AM    CREATININE 0.99 11/22/2018 03:29 AM      Lab Results   Component Value Date/Time    ALTSGPT 14 11/22/2018 03:29 AM    ASTSGOT 15 11/22/2018 03:29 AM    ALKPHOSPHAT 70 11/22/2018 03:29 AM    TBILIRUBIN 0.6 11/22/2018 03:29 AM    LIPASE 45 11/19/2018 09:07 PM    ALBUMIN 3.7 11/22/2018 03:29 AM    GLOBULIN 2.5 11/22/2018 03:29 AM    INR 0.96 11/19/2018 09:07 PM     Lab Results   Component Value Date/Time    PROTHROMBTM 12.8 11/19/2018 09:07 PM    INR 0.96 11/19/2018 09:07 PM

## 2020-08-24 NOTE — CARE PLAN
Problem: Discharge Barriers/Planning  Goal: Patient's continuum of care needs will be met  Outcome: PROGRESSING AS EXPECTED  Pt needs outpt psych set up, ARELI and I[facility] Colon working with pt.     Problem: Psychosocial Needs:  Goal: Level of anxiety will decrease  Outcome: PROGRESSING SLOWER THAN EXPECTED  Pt has a higher level of anxiety today. Pacing back and forth, not redirectable at this time. Will give PRN and continue to monitor.        no

## 2021-05-04 NOTE — DISCHARGE PLANNING
Anticipated Discharge Disposition: Return to [place], [place], today via [employer] once he gets his meds and [employer] is set up.    Action: This RN CM faxed the Approved Service form to the [facility] to pay for his gabapentin, hydroxyzine, trazodone and fluoxetine.  The total amount for these is $34.97.    Barriers to Discharge: [employer] needs to be set up and the patient has to have the above meds before he can leave.    Plan: Will  meds from the [facility] once they are ready for pick-up.    Update: This RN CM picked up the patient's meds and gave them to him.  Lakshmi, our care coordination , will work with the bedside nurse to coordinate [employer] for a 12:30 PM [facility].  Lakshmi can be reached at [phone].  Patient advised of the pick-up time.  He said that he will get dressed and ready.   Skyrizi Counseling: I discussed with the patient the risks of risankizumab-rzaa including but not limited to immunosuppression, and serious infections.  The patient understands that monitoring is required including a PPD at baseline and must alert us or the primary physician if symptoms of infection or other concerning signs are noted.